# Patient Record
Sex: MALE | Race: OTHER | Employment: OTHER | URBAN - METROPOLITAN AREA
[De-identification: names, ages, dates, MRNs, and addresses within clinical notes are randomized per-mention and may not be internally consistent; named-entity substitution may affect disease eponyms.]

---

## 2020-06-23 ENCOUNTER — VIRTUAL VISIT (OUTPATIENT)
Dept: PRIMARY CARE CLINIC | Age: 75
End: 2020-06-23

## 2020-06-23 DIAGNOSIS — G89.29 CHRONIC PAIN OF BOTH KNEES: Primary | ICD-10-CM

## 2020-06-23 DIAGNOSIS — M25.562 CHRONIC PAIN OF BOTH KNEES: Primary | ICD-10-CM

## 2020-06-23 DIAGNOSIS — E78.2 MIXED HYPERLIPIDEMIA: ICD-10-CM

## 2020-06-23 DIAGNOSIS — N40.0 BENIGN PROSTATIC HYPERPLASIA WITHOUT LOWER URINARY TRACT SYMPTOMS: ICD-10-CM

## 2020-06-23 DIAGNOSIS — M25.561 CHRONIC PAIN OF BOTH KNEES: Primary | ICD-10-CM

## 2020-06-23 DIAGNOSIS — K21.9 GASTROESOPHAGEAL REFLUX DISEASE WITHOUT ESOPHAGITIS: ICD-10-CM

## 2020-06-23 DIAGNOSIS — M19.90 ARTHRITIS: ICD-10-CM

## 2020-06-23 RX ORDER — PHENOL/SODIUM PHENOLATE
20 AEROSOL, SPRAY (ML) MUCOUS MEMBRANE DAILY
Qty: 30 TAB | Refills: 0 | Status: SHIPPED | OUTPATIENT
Start: 2020-06-23 | End: 2020-12-07 | Stop reason: SDUPTHER

## 2020-06-23 RX ORDER — TERAZOSIN 2 MG/1
2 CAPSULE ORAL
Qty: 30 CAP | Refills: 0 | COMMUNITY
Start: 2020-06-23 | End: 2020-06-23 | Stop reason: SDUPTHER

## 2020-06-23 RX ORDER — TERAZOSIN 2 MG/1
2 CAPSULE ORAL
Qty: 30 CAP | Refills: 0 | Status: SHIPPED | OUTPATIENT
Start: 2020-06-23 | End: 2020-08-03

## 2020-06-23 RX ORDER — FENOFIBRATE 145 MG/1
145 TABLET, COATED ORAL DAILY
Qty: 30 TAB | Refills: 0 | COMMUNITY
Start: 2020-06-23 | End: 2021-07-13 | Stop reason: ALTCHOICE

## 2020-06-23 RX ORDER — PHENOL/SODIUM PHENOLATE
20 AEROSOL, SPRAY (ML) MUCOUS MEMBRANE DAILY
Qty: 30 TAB | Refills: 0 | COMMUNITY
Start: 2020-06-23 | End: 2020-06-23 | Stop reason: SDUPTHER

## 2020-06-23 RX ORDER — DICLOFENAC SODIUM 10 MG/G
2 GEL TOPICAL 4 TIMES DAILY
Qty: 100 G | Refills: 0 | Status: SHIPPED | OUTPATIENT
Start: 2020-06-23 | End: 2020-08-04

## 2020-06-23 RX ORDER — TRAMADOL HYDROCHLORIDE 50 MG/1
50 TABLET ORAL
Qty: 12 TAB | Refills: 0 | Status: SHIPPED | OUTPATIENT
Start: 2020-06-23 | End: 2020-07-31 | Stop reason: SDUPTHER

## 2020-06-23 RX ORDER — FENOFIBRATE 160 MG/1
160 TABLET ORAL DAILY
Qty: 90 TAB | Refills: 0 | Status: SHIPPED | OUTPATIENT
Start: 2020-06-23 | End: 2020-10-18

## 2020-06-23 NOTE — PROGRESS NOTES
Written by Aylin Rockwell, as dictated by Dr. Rosalio Murrieta MD.  Todd Montenegro is a 67 y.o. male. HPI  I was in the office while conducting this encounter. Consent:  He and/or his healthcare decision maker is aware that this patient-initiated Telehealth encounter is a billable service, with coverage as determined by his insurance carrier. He is aware that he may receive a bill and has provided verbal consent to proceed: Yes    This virtual visit was conducted via [employer]. Pursuant to the emergency declaration under the [facility], [facility] waiver authority and the [employer] Act, this Virtual  Visit was conducted to reduce the patient's risk of exposure to COVID-19 and provide continuity of care for an established patient. Services were provided through a video synchronous discussion virtually to substitute for in-person clinic visit. Due to this being a TeleHealth evaluation, many elements of the physical examination are unable to be assessed. Pt presents today to review his prescriptions and establish care. His daughter is also present, who helps translates during the visit. He has been taking Tylenol Arthritis, Ibuprofen PRN and Tramadol 50 mg (per specialist) PRN for his bilateral knee pain over the last 2-6 years. Every 6 months he receives bilateral gel and cortisone injections through his specialist. He inquires about better pain treatment for his bilateral arthritic knee pains. According to pt, his labs 4-5 months ago were all normal, including kidney numbers. He notes some back and neck pains that he previously went to PT 3x/weekly for. Previously he has tried OTC creams for pain management with no relief. His BP today was 127/82 with a pulse of 85. Pt continues on Fenofibrate 160 mg, Omeprazole 20 mg, Terazosin 2 mg, a multivitamin, and Asprin 81 mg.  He notes some occasional break through heart burn. His daughter denies hx of heart condition or DM. His daughter is concerned about an in-person visit due to Herminio and cancelled his recent in person visit with Dr. Chelsy Amato. Patient Active Problem List   Diagnosis Code    Benign prostatic hyperplasia without lower urinary tract symptoms N40.0    Arthritis M19.90    Gastroesophageal reflux disease without esophagitis K21.9    Mixed hyperlipidemia E78.2        Current Outpatient Medications on File Prior to Visit   Medication Sig Dispense Refill    fenofibrate nanocrystallized (TRICOR) 145 mg tablet Take 1 Tab by mouth daily. 30 Tab 0    [DISCONTINUED] Omeprazole delayed release (PRILOSEC D/R) 20 mg tablet Take 1 Tab by mouth daily. 30 Tab 0    [DISCONTINUED] terazosin (HYTRIN) 2 mg capsule Take 1 Cap by mouth nightly. 30 Cap 0     No current facility-administered medications on file prior to visit.             Social History     Socioeconomic History    Marital status: UNKNOWN     Spouse name: Not on file    Number of children: Not on file    Years of education: Not on file    Highest education level: Not on file   Occupational History    Not on file   Social Needs    Financial resource strain: Not on file    Food insecurity     Worry: Not on file     Inability: Not on file    Transportation needs     Medical: Not on file     Non-medical: Not on file   Tobacco Use    Smoking status: Not on file   Substance and Sexual Activity    Alcohol use: Not on file    Drug use: Not on file    Sexual activity: Not on file   Lifestyle    Physical activity     Days per week: Not on file     Minutes per session: Not on file    Stress: Not on file   Relationships    Social connections     Talks on phone: Not on file     Gets together: Not on file     Attends Holiness service: Not on file     Active member of club or organization: Not on file     Attends meetings of clubs or organizations: Not on file     Relationship status: Not on file    Intimate partner violence     Fear of current or ex partner: Not on file     Emotionally abused: Not on file     Physically abused: Not on file     Forced sexual activity: Not on file   Other Topics Concern    Not on file   Social History Narrative    Not on file       No results found for any previous visit.   '    Review of Systems   Constitutional: Negative for malaise/fatigue and weight loss. HENT: Negative for congestion and hearing loss. Eyes: Negative for blurred vision and photophobia. Respiratory: Negative for cough and shortness of breath. Cardiovascular: Negative for chest pain and leg swelling. Gastrointestinal: Negative for constipation, diarrhea and heartburn. Genitourinary: Negative for dysuria, frequency and urgency. Musculoskeletal: Positive for joint pain (Bilateral knee). Negative for myalgias. Neurological: Negative for dizziness and headaches. Psychiatric/Behavioral: Negative for depression and substance abuse. The patient is not nervous/anxious and does not have insomnia. There were no vitals taken for this visit. Physical Exam  Constitutional:       General: He is not in acute distress. Appearance: Normal appearance. He is not diaphoretic. HENT:      Head: Normocephalic and atraumatic. Nose: No congestion. Eyes:      General:         Right eye: No discharge. Left eye: No discharge. Conjunctiva/sclera: Conjunctivae normal.   Pulmonary:      Effort: Pulmonary effort is normal. No respiratory distress. Neurological:      General: No focal deficit present. Mental Status: He is alert and oriented to person, place, and time. Mental status is at baseline. Psychiatric:         Behavior: Behavior normal.         Thought Content: Thought content normal.         ASSESSMENT and PLAN    ICD-10-CM ICD-9-CM    1. Chronic pain of both knees M25.561 719.46 diclofenac (VOLTAREN) 1 % gel sent to pharmacy.       M25.562 338.29 traMADoL (ULTRAM) 50 mg tablet sent to pharmacy. G89.29    Refilled a few tablets of Tramadol for pt to use very PRN. Informed pt that I am not comfortable with him taking Tramadol frequently and long-term at his age and if he wants to take it long-term he should talk to pain management. Prescribed Diclofenac gel for pt to use up to QID to manage his knee pain. Potential side effects were discussed. Explained that since it is a localized epidermal pain management, it affects the kidney and liver negligibly. Discussed with pt that he can continue with injections with Dr. Sonal Meyer and try this prescription strength gel between the injections and monitor for pain relief. 2. Arthritis M19.90 716.90 diclofenac (VOLTAREN) 1 % gel sent to pharmacy. traMADoL (ULTRAM) 50 mg tablet sent to pharmacy. . Discussed with pt that he can continue with injections with Dr. Sonal Meyer and try this prescription strength gel between the injections and monitor for pain relief. 3. Gastroesophageal reflux disease without esophagitis K21.9 530.81 Omeprazole delayed release (PRILOSEC D/R) 20 mg tablet sent to pharmacy. Stable, pt continues on Omeprazole 20 mg with some noted occasional break through heart burn. Discussed with pt that Ibuprofen and Aleve can cause increased reflux. 4. Mixed hyperlipidemia E78.2 272.2 fenofibrate nanocrystallized (TRICOR) 145 mg tablet sent to pharmacy. Stable, pt continues on Fenofibrate. fenofibrate (LOFIBRA) 160 mg tablet sent to pharmacy. 5. Benign prostatic hyperplasia without lower urinary tract symptoms N40.0 600.00 terazosin (HYTRIN) 2 mg capsule sent to pharmacy. Stable, pt continues on Terazosin with no BPH sx noted. This plan was reviewed with the patient and patient agrees. All questions were answered. This scribe documentation was reviewed by me and accurately reflects the examination and decisions made by me.     This note will not be viewable in [address]

## 2020-07-31 DIAGNOSIS — M25.561 CHRONIC PAIN OF BOTH KNEES: ICD-10-CM

## 2020-07-31 DIAGNOSIS — M19.90 ARTHRITIS: ICD-10-CM

## 2020-07-31 DIAGNOSIS — G89.29 CHRONIC PAIN OF BOTH KNEES: ICD-10-CM

## 2020-07-31 DIAGNOSIS — M25.562 CHRONIC PAIN OF BOTH KNEES: ICD-10-CM

## 2020-08-03 DIAGNOSIS — N40.0 BENIGN PROSTATIC HYPERPLASIA WITHOUT LOWER URINARY TRACT SYMPTOMS: ICD-10-CM

## 2020-08-03 RX ORDER — TRAMADOL HYDROCHLORIDE 50 MG/1
50 TABLET ORAL
Qty: 12 TAB | Refills: 0 | Status: SHIPPED | OUTPATIENT
Start: 2020-08-03 | End: 2020-08-12

## 2020-08-03 RX ORDER — TERAZOSIN 2 MG/1
CAPSULE ORAL
Qty: 30 CAP | Refills: 0 | Status: SHIPPED | OUTPATIENT
Start: 2020-08-03 | End: 2020-08-26

## 2020-08-04 DIAGNOSIS — G89.29 CHRONIC PAIN OF BOTH KNEES: ICD-10-CM

## 2020-08-04 DIAGNOSIS — M19.90 ARTHRITIS: ICD-10-CM

## 2020-08-04 DIAGNOSIS — M25.562 CHRONIC PAIN OF BOTH KNEES: ICD-10-CM

## 2020-08-04 DIAGNOSIS — M25.561 CHRONIC PAIN OF BOTH KNEES: ICD-10-CM

## 2020-08-04 RX ORDER — DICLOFENAC SODIUM 10 MG/G
GEL TOPICAL
Qty: 100 G | Refills: 0 | Status: SHIPPED | OUTPATIENT
Start: 2020-08-04 | End: 2020-08-28

## 2020-08-04 RX ORDER — TRAMADOL HYDROCHLORIDE 50 MG/1
50 TABLET ORAL
Qty: 12 TAB | Refills: 0 | OUTPATIENT
Start: 2020-08-04 | End: 2020-08-13

## 2020-08-04 NOTE — TELEPHONE ENCOUNTER
Pharmacy is sending patient request for larger supply of medication tramadol  Refilled yesterday for 12 tabs

## 2020-08-26 DIAGNOSIS — N40.0 BENIGN PROSTATIC HYPERPLASIA WITHOUT LOWER URINARY TRACT SYMPTOMS: ICD-10-CM

## 2020-08-26 RX ORDER — TERAZOSIN 2 MG/1
CAPSULE ORAL
Qty: 30 CAP | Refills: 0 | Status: SHIPPED | OUTPATIENT
Start: 2020-08-26 | End: 2020-09-25

## 2020-08-28 DIAGNOSIS — M25.562 CHRONIC PAIN OF BOTH KNEES: ICD-10-CM

## 2020-08-28 DIAGNOSIS — M19.90 ARTHRITIS: ICD-10-CM

## 2020-08-28 DIAGNOSIS — M25.561 CHRONIC PAIN OF BOTH KNEES: ICD-10-CM

## 2020-08-28 DIAGNOSIS — G89.29 CHRONIC PAIN OF BOTH KNEES: ICD-10-CM

## 2020-08-28 RX ORDER — DICLOFENAC SODIUM 10 MG/G
GEL TOPICAL
Qty: 100 G | Refills: 0 | Status: SHIPPED | OUTPATIENT
Start: 2020-08-28 | End: 2021-07-13 | Stop reason: ALTCHOICE

## 2020-09-23 ENCOUNTER — OFFICE VISIT (OUTPATIENT)
Dept: PRIMARY CARE CLINIC | Age: 75
End: 2020-09-23
Payer: MEDICARE

## 2020-09-23 VITALS
BODY MASS INDEX: 26.97 KG/M2 | HEART RATE: 64 BPM | WEIGHT: 188.4 LBS | HEIGHT: 70 IN | TEMPERATURE: 98.3 F | DIASTOLIC BLOOD PRESSURE: 82 MMHG | SYSTOLIC BLOOD PRESSURE: 146 MMHG | OXYGEN SATURATION: 98 % | RESPIRATION RATE: 16 BRPM

## 2020-09-23 DIAGNOSIS — M25.562 CHRONIC PAIN OF LEFT KNEE: ICD-10-CM

## 2020-09-23 DIAGNOSIS — Z13.5 GLAUCOMA SCREENING: ICD-10-CM

## 2020-09-23 DIAGNOSIS — G89.29 CHRONIC PAIN OF LEFT KNEE: ICD-10-CM

## 2020-09-23 DIAGNOSIS — M19.90 ARTHRITIS: ICD-10-CM

## 2020-09-23 DIAGNOSIS — Z71.89 ACP (ADVANCE CARE PLANNING): ICD-10-CM

## 2020-09-23 DIAGNOSIS — R73.02 IGT (IMPAIRED GLUCOSE TOLERANCE): ICD-10-CM

## 2020-09-23 DIAGNOSIS — N40.0 BENIGN PROSTATIC HYPERPLASIA WITHOUT LOWER URINARY TRACT SYMPTOMS: ICD-10-CM

## 2020-09-23 DIAGNOSIS — E78.2 MIXED HYPERLIPIDEMIA: ICD-10-CM

## 2020-09-23 DIAGNOSIS — F51.01 PRIMARY INSOMNIA: ICD-10-CM

## 2020-09-23 DIAGNOSIS — Z23 NEEDS FLU SHOT: ICD-10-CM

## 2020-09-23 DIAGNOSIS — Z00.00 MEDICARE ANNUAL WELLNESS VISIT, SUBSEQUENT: Primary | ICD-10-CM

## 2020-09-23 LAB
ALBUMIN SERPL-MCNC: 4.2 G/DL (ref 3.5–5)
ALBUMIN/GLOB SERPL: 1.4 {RATIO} (ref 1.1–2.2)
ALP SERPL-CCNC: 48 U/L (ref 45–117)
ALT SERPL-CCNC: 28 U/L (ref 12–78)
ANION GAP SERPL CALC-SCNC: 5 MMOL/L (ref 5–15)
AST SERPL-CCNC: 20 U/L (ref 15–37)
BILIRUB SERPL-MCNC: 0.6 MG/DL (ref 0.2–1)
BUN SERPL-MCNC: 18 MG/DL (ref 6–20)
BUN/CREAT SERPL: 16 (ref 12–20)
CALCIUM SERPL-MCNC: 9.1 MG/DL (ref 8.5–10.1)
CHLORIDE SERPL-SCNC: 108 MMOL/L (ref 97–108)
CHOLEST SERPL-MCNC: 151 MG/DL
CO2 SERPL-SCNC: 26 MMOL/L (ref 21–32)
CREAT SERPL-MCNC: 1.11 MG/DL (ref 0.7–1.3)
ERYTHROCYTE [DISTWIDTH] IN BLOOD BY AUTOMATED COUNT: 11.9 % (ref 11.5–14.5)
EST. AVERAGE GLUCOSE BLD GHB EST-MCNC: 108 MG/DL
GLOBULIN SER CALC-MCNC: 3 G/DL (ref 2–4)
GLUCOSE SERPL-MCNC: 88 MG/DL (ref 65–100)
HBA1C MFR BLD: 5.4 % (ref 4–5.6)
HCT VFR BLD AUTO: 40.4 % (ref 36.6–50.3)
HDLC SERPL-MCNC: 45 MG/DL
HDLC SERPL: 3.4 {RATIO} (ref 0–5)
HGB BLD-MCNC: 13.8 G/DL (ref 12.1–17)
LDLC SERPL CALC-MCNC: 70.2 MG/DL (ref 0–100)
LIPID PROFILE,FLP: ABNORMAL
MCH RBC QN AUTO: 31.4 PG (ref 26–34)
MCHC RBC AUTO-ENTMCNC: 34.2 G/DL (ref 30–36.5)
MCV RBC AUTO: 92 FL (ref 80–99)
NRBC # BLD: 0 K/UL (ref 0–0.01)
NRBC BLD-RTO: 0 PER 100 WBC
PLATELET # BLD AUTO: 182 K/UL (ref 150–400)
PMV BLD AUTO: 11 FL (ref 8.9–12.9)
POTASSIUM SERPL-SCNC: 4.2 MMOL/L (ref 3.5–5.1)
PROT SERPL-MCNC: 7.2 G/DL (ref 6.4–8.2)
PSA SERPL-MCNC: 1.2 NG/ML (ref 0.01–4)
RBC # BLD AUTO: 4.39 M/UL (ref 4.1–5.7)
SODIUM SERPL-SCNC: 139 MMOL/L (ref 136–145)
TRIGL SERPL-MCNC: 179 MG/DL (ref ?–150)
VLDLC SERPL CALC-MCNC: 35.8 MG/DL
WBC # BLD AUTO: 4.8 K/UL (ref 4.1–11.1)

## 2020-09-23 PROCEDURE — G0008 ADMIN INFLUENZA VIRUS VAC: HCPCS | Performed by: INTERNAL MEDICINE

## 2020-09-23 PROCEDURE — 99214 OFFICE O/P EST MOD 30 MIN: CPT | Performed by: INTERNAL MEDICINE

## 2020-09-23 PROCEDURE — G0439 PPPS, SUBSEQ VISIT: HCPCS | Performed by: INTERNAL MEDICINE

## 2020-09-23 PROCEDURE — G8510 SCR DEP NEG, NO PLAN REQD: HCPCS | Performed by: INTERNAL MEDICINE

## 2020-09-23 PROCEDURE — 90694 VACC AIIV4 NO PRSRV 0.5ML IM: CPT | Performed by: INTERNAL MEDICINE

## 2020-09-23 PROCEDURE — 1101F PT FALLS ASSESS-DOCD LE1/YR: CPT | Performed by: INTERNAL MEDICINE

## 2020-09-23 PROCEDURE — G8536 NO DOC ELDER MAL SCRN: HCPCS | Performed by: INTERNAL MEDICINE

## 2020-09-23 PROCEDURE — G8419 CALC BMI OUT NRM PARAM NOF/U: HCPCS | Performed by: INTERNAL MEDICINE

## 2020-09-23 PROCEDURE — G8427 DOCREV CUR MEDS BY ELIG CLIN: HCPCS | Performed by: INTERNAL MEDICINE

## 2020-09-23 RX ORDER — DICLOFENAC SODIUM 75 MG/1
75 TABLET, DELAYED RELEASE ORAL 2 TIMES DAILY
Qty: 60 TAB | Refills: 0 | Status: SHIPPED | OUTPATIENT
Start: 2020-09-23 | End: 2020-10-23

## 2020-09-23 RX ORDER — CYCLOBENZAPRINE HCL 10 MG
10 TABLET ORAL
Qty: 30 TAB | Refills: 0 | Status: SHIPPED | OUTPATIENT
Start: 2020-09-23 | End: 2020-10-23

## 2020-09-23 NOTE — PROGRESS NOTES
Linda Seals is a 67 y.o. male and presents for Annual Medicare Wellness Visit. Assessment of cognitive impairment: Alert and oriented x 3     Depression Screen:   3 most recent PHQ Screens 9/23/2020   Little interest or pleasure in doing things Not at all   Feeling down, depressed, irritable, or hopeless Not at all   Total Score PHQ 2 0       Fall Risk Assessment:    Fall Risk Assessment, last 12 mths 9/23/2020   Able to walk? Yes   Fall in past 12 months? No       Abuse Screen:   Abuse Screening Questionnaire 9/23/2020   Do you ever feel afraid of your partner? N   Are you in a relationship with someone who physically or mentally threatens you? N   Is it safe for you to go home? Y       Activities of Daily Living:  Self-care. Requires assistance with: ambulation and no ADLs  Patient handle his/her own medications  yes Use of pill box  yes  Activities of Daily Living:   ADL Assessment 9/23/2020   Feeding yourself No Help Needed   Getting from bed to chair No Help Needed   Getting dressed No Help Needed   Bathing or showering No Help Needed   Walk across the room (includes cane/walker) No Help Needed   Using the telphone No Help Needed   Taking your medications No Help Needed   Preparing meals No Help Needed   Managing money (expenses/bills) No Help Needed   Moderately strenuous housework (laundry) No Help Needed   Shopping for personal items (toiletries/medicines) No Help Needed   Shopping for groceries No Help Needed   Driving No Help Needed   Climbing a flight of stairs No Help Needed   Getting to places beyond walking distances No Help Needed       Health Maintenance:  Daily Aspirin: yes  Bone Density:N/a   Glaucoma Screening: 3 years ago in [place]   Immunizations:    Tetanus: not sure , will find out from previous PCP . Influenza: will give today . Shingles: declined   PPSV-23:thinks he got it in [place] . Prevnar-13: N/a     Cancer screening:    . Colon: done in 2017 in [place]   Prostate:   Will check today Alcohol Risk Screen:   On any occasion during the past 3 months, have you had more than 3 drinks(female) or 4 drinks (male) containing alcohol in one? No  Do you average more than 7 drinks (female) or 14 drinks (male) per week? No      Hearing Loss:  Hearing is good. denies any hearing loss    Vision Loss:   Wears glasses, contact lenses, or have any other visual impairment  yes    Adult Nutrition Screen:  No risk factors noted. Advance Care Planning:   End of Life Planning: has NO advanced directive  - add't info requested. Referral to SW: yes,   [facility] ACP-Facilitator appointment yes      Medications/Allergies: Reviewed with patient  Prior to Admission medications    Medication Sig Start Date End Date Taking? Authorizing Provider   diclofenac EC (VOLTAREN) 75 mg EC tablet Take 1 Tab by mouth two (2) times a day for 30 days. 9/23/20 10/23/20 Yes Albert Cuellar MD   cyclobenzaprine (FLEXERIL) 10 mg tablet Take 1 Tab by mouth nightly for 30 doses. 9/23/20 10/23/20 Yes Albert Cuellar MD   diclofenac (VOLTAREN) 1 % gel APPLY 2 GRAMS TO AFFECTED AREA FOUR (4) TIMES DAILY FOR 30 DAYS. 8/28/20  Yes Albert Cuellar MD   terazosin (HYTRIN) 2 mg capsule TAKE 1 CAPSULE BY MOUTH EVERY DAY AT NIGHT 8/26/20  Yes Albert Cuellar MD   fenofibrate nanocrystallized (TRICOR) 145 mg tablet Take 1 Tab by mouth daily. 6/23/20  Yes Albert Cuellar MD   Omeprazole delayed release (PRILOSEC D/R) 20 mg tablet Take 1 Tab by mouth daily. 6/23/20  Yes Albert Cuellar MD       No Known Allergies    Objective:  Visit Vitals  BP (!) 152/74 (BP 1 Location: Right arm, BP Patient Position: Sitting)   Pulse 64   Temp 98.3 °F (36.8 °C) (Temporal)   Resp 16   Ht 5' 10\" (1.778 m)   Wt 188 lb 6.4 oz (85.5 kg)   SpO2 98%   BMI 27.03 kg/m²    Body mass index is 27.03 kg/m². Problem List: Reviewed with patient and discussed risk factors.     Patient Active Problem List   Diagnosis Code    Benign prostatic hyperplasia without lower urinary tract symptoms N40.0    Arthritis M19.90    Gastroesophageal reflux disease without esophagitis K21.9    Mixed hyperlipidemia E78.2       PSH: Reviewed with patient  No past surgical history on file. SH: Reviewed with patient  Social History     Tobacco Use    Smoking status: Never Smoker    Smokeless tobacco: Never Used   Substance Use Topics    Alcohol use: Not Currently     Frequency: Never    Drug use: Never       FH: Reviewed with patient  No family history on file. Current medical providers:    Patient Care Team:  Olimpia Alvares MD as PCP - General (Internal Medicine)  Olimpia Alvares MD as PCP - [facility] Provider    Plan:    Diagnoses and all orders for this visit:    Medicare annual wellness visit, subsequent  Immunization and Health screening discussed with him. He will get his record from his previous PCP office. ACP (advance care planning)  -     REFERRAL TO ACP CLINICAL SPECIALIST      Needs flu shot  -     FLU (FLUAD QUAD INFLUENZA VACCINE,QUAD,ADJUVANTED)    Glaucoma screening  -     REFERRAL TO OPHTHALMOLOGY        Orders Placed This Encounter    METABOLIC PANEL, COMPREHENSIVE    CBC W/O DIFF    LIPID PANEL    HEMOGLOBIN A1C WITH EAG    PROSTATE SPECIFIC AG    diclofenac EC (VOLTAREN) 75 mg EC tablet    cyclobenzaprine (FLEXERIL) 10 mg tablet       Health Maintenance   Topic Date Due    DTaP/Tdap/Td series (1 - Tdap) 05/15/1965    Shingrix Vaccine Age 50> (1 of 2) 05/15/1994    GLAUCOMA SCREENING Q2Y  05/15/2009    Pneumococcal 65+ years (1 of 1 - PPSV23) 05/15/2009    Flu Vaccine (1) 09/01/2020          Urinary/ Fecal Incontinence: none     Regular physical exercise: Stays active , walks 30 minutes daily     Patient verbalized understanding of information presented. AVS and Medicare Part B Preventive Services Table printed and given to pt and reviewed. See table for findings under Recommendation and Scheduled.  All of the patient's questions were answered. tiffanie by Katheren Boxer, as dictated by Dr. Mike Blankenship MD.    Pia Campbell is a 67 y.o. male. HPI  Pt presents today for routine care follow-up. Pt's BP is elevated today in office at 152/74, 146/82 on manual repeat in R arm while sitting down. His readings at home are running in the normal range. He could not sleep very well last night because his B/L thumbs and L knee were hurting. He used to get tramadol from his old doctor. He sees Dr. Suni Gant for injections in his knees every 3 month but he does not get pain medication from Dr. Suni Gant because insurance does not cover it. Dr. Suni Gant has said that his knee was improving with PT. Surgery is an option, but he does not want to have it yet. The pain does not bother him during the daytime, but it is bad at night. He would like to get the flu vaccine in office today. Patient Active Problem List   Diagnosis Code    Benign prostatic hyperplasia without lower urinary tract symptoms N40.0    Arthritis M19.90    Gastroesophageal reflux disease without esophagitis K21.9    Mixed hyperlipidemia E78.2        Current Outpatient Medications on File Prior to Visit   Medication Sig Dispense Refill    diclofenac (VOLTAREN) 1 % gel APPLY 2 GRAMS TO AFFECTED AREA FOUR (4) TIMES DAILY FOR 30 DAYS. 100 g 0    terazosin (HYTRIN) 2 mg capsule TAKE 1 CAPSULE BY MOUTH EVERY DAY AT NIGHT 30 Cap 0    fenofibrate nanocrystallized (TRICOR) 145 mg tablet Take 1 Tab by mouth daily. 30 Tab 0    Omeprazole delayed release (PRILOSEC D/R) 20 mg tablet Take 1 Tab by mouth daily. 30 Tab 0     No current facility-administered medications on file prior to visit.         No Known Allergies      Social History     Socioeconomic History    Marital status: UNKNOWN     Spouse name: Not on file    Number of children: Not on file    Years of education: Not on file    Highest education level: Not on file   Occupational History    Not on file   Social Needs    Financial resource strain: Not on file    Food insecurity     Worry: Not on file     Inability: Not on file    Transportation needs     Medical: Not on file     Non-medical: Not on file   Tobacco Use    Smoking status: Never Smoker    Smokeless tobacco: Never Used   Substance and Sexual Activity    Alcohol use: Not Currently     Frequency: Never    Drug use: Never    Sexual activity: Not on file   Lifestyle    Physical activity     Days per week: Not on file     Minutes per session: Not on file    Stress: Not on file   Relationships    Social connections     Talks on phone: Not on file     Gets together: Not on file     Attends Yazdanism service: Not on file     Active member of club or organization: Not on file     Attends meetings of clubs or organizations: Not on file     Relationship status: Not on file    Intimate partner violence     Fear of current or ex partner: Not on file     Emotionally abused: Not on file     Physically abused: Not on file     Forced sexual activity: Not on file   Other Topics Concern    Not on file   Social History Narrative    Not on file       No visits with results within 3 Month(s) from this visit. Latest known visit with results is:   No results found for any previous visit. Review of Systems   Constitutional: Negative for malaise/fatigue and weight loss. HENT: Negative for congestion and sore throat. Eyes: Negative for blurred vision and photophobia. Respiratory: Negative for cough and shortness of breath. Cardiovascular: Negative for chest pain and leg swelling. Gastrointestinal: Negative for constipation and heartburn. Genitourinary: Negative for frequency and urgency. Musculoskeletal: Positive for joint pain (L knee; B/L thumbs). Negative for back pain and myalgias. Neurological: Negative for dizziness and headaches. Psychiatric/Behavioral: Negative for depression. The patient has insomnia.  The patient is not nervous/anxious. Visit Vitals  BP (!) 146/82 (BP 1 Location: Right arm, BP Patient Position: Sitting)   Pulse 64   Temp 98.3 °F (36.8 °C) (Temporal)   Resp 16   Ht 5' 10\" (1.778 m)   Wt 188 lb 6.4 oz (85.5 kg)   SpO2 98%   BMI 27.03 kg/m²     Physical Exam  Vitals signs and nursing note reviewed. Constitutional:       General: He is not in acute distress. Appearance: Normal appearance. He is well-developed and well-groomed. He is not diaphoretic. HENT:      Head: Normocephalic and atraumatic. Right Ear: Hearing and external ear normal.      Left Ear: Hearing and external ear normal.      Nose: No congestion. Eyes:      General: No scleral icterus. Right eye: No discharge. Left eye: No discharge. Extraocular Movements: Extraocular movements intact. Conjunctiva/sclera: Conjunctivae normal.   Neck:      Musculoskeletal: Normal range of motion and neck supple. Cardiovascular:      Rate and Rhythm: Normal rate and regular rhythm. Pulmonary:      Effort: Pulmonary effort is normal. No respiratory distress. Breath sounds: Normal breath sounds and air entry. No wheezing. Abdominal:      General: Bowel sounds are normal.      Palpations: Abdomen is soft. Tenderness: There is no abdominal tenderness. Lymphadenopathy:      Cervical: No cervical adenopathy. Neurological:      Mental Status: He is alert and oriented to person, place, and time. Mental status is at baseline. Psychiatric:         Mood and Affect: Mood and affect normal.         Behavior: Behavior normal.         Thought Content: Thought content normal.       ASSESSMENT and PLAN    ICD-10-CM ICD-9-CM    1. Chronic pain of left knee  M25.562 719.46 diclofenac EC (VOLTAREN) 75 mg EC tablet sent to pharmacy. Potential side effects were discussed. I explained that tramadol is a controlled substance  that I cannot prescribe it long term for him. Instead, I prescribed diclofenac and flexeril. G89.29 338.29 cyclobenzaprine (FLEXERIL) 10 mg tablet sent to pharmacy. Potential side effects were discussed. I explained that tramadol is a controlled substance and that I cannot prescribe it long term for him. Instead, I prescribed diclofenac and flexeril. 2. Arthritis  M19.90 716.90 cyclobenzaprine (FLEXERIL) 10 mg tablet sent to pharmacy. Potential side effects were discussed. I explained that tramadol is a controlled substance and that I cannot prescribe it long term for him. Instead, I prescribed diclofenac and flexeril. 3. Primary insomnia  F51.01 307.42 We discussed that flexeril will likely help him sleep. 4. Benign prostatic hyperplasia without lower urinary tract symptoms  N40.0 600.00 PSA, DIAGNOSTIC (PROSTATE SPECIFIC AG)    Labs drawn in office today. 5. Mixed hyperlipidemia  P30.4 331.5 METABOLIC PANEL, COMPREHENSIVE      CBC W/O DIFF      LIPID PANEL    Ordered fasting labs for pt to complete today in office. Waiting on results. Recommended checking blood pressure at home & if above 140/90 let me know. 6. IGT (impaired glucose tolerance)  R73.02 790.22 HEMOGLOBIN A1C WITH EAG    Labs drawn in office today. This plan was reviewed with the patient and patient agrees. All questions were answered. This scribe documentation was reviewed by me and accurately reflects the examination and decisions made by me. This note will not be viewable in [address]

## 2020-09-23 NOTE — PATIENT INSTRUCTIONS
Medicare Wellness Visit, Male    The best way to live healthy is to have a lifestyle where you eat a well-balanced diet, exercise regularly, limit alcohol use, and quit all forms of tobacco/nicotine, if applicable. Regular preventive services are another way to keep healthy. Preventive services (vaccines, screening tests, monitoring & exams) can help personalize your care plan, which helps you manage your own care. Screening tests can find health problems at the earliest stages, when they are easiest to treat. [facility] follows the current, evidence-based guidelines published by the [facility] ([facility]) when recommending preventive services for our patients. Because we follow these guidelines, sometimes recommendations change over time as research supports it. (For example, a prostate screening blood test is no longer routinely recommended for men with no symptoms). Of course, you and your doctor may decide to screen more often for some diseases, based on your risk and co-morbidities (chronic disease you are already diagnosed with). Preventive services for you include:  - Medicare offers their members a free annual wellness visit, which is time for you and your primary care provider to discuss and plan for your preventive service needs. Take advantage of this benefit every year!  -All adults over age 72 should receive the recommended pneumonia vaccines. Current USPSTF guidelines recommend a series of two vaccines for the best pneumonia protection.   -All adults should have a flu vaccine yearly and tetanus vaccine every 10 years.  -All adults age 48 and older should receive the shingles vaccines (series of two vaccines).        -All adults age 38-68 who are overweight should have a diabetes screening test once every three years.   -Other screening tests & preventive services for persons with diabetes include: an eye exam to screen for diabetic retinopathy, a kidney function test, a foot exam, and stricter control over your cholesterol.   -Cardiovascular screening for adults with routine risk involves an electrocardiogram (ECG) at intervals determined by the provider.   -Colorectal cancer screening should be done for adults age 54-65 with no increased risk factors for colorectal cancer. There are a number of acceptable methods of screening for this type of cancer. Each test has its own benefits and drawbacks. Discuss with your provider what is most appropriate for you during your annual wellness visit. The different tests include: colonoscopy (considered the best screening method), a fecal occult blood test, a fecal DNA test, and sigmoidoscopy.  -All adults born between [facility] should be screened once for Hepatitis C.  -An Abdominal Aortic Aneurysm (AAA) Screening is recommended for men age 73-68 who has ever smoked in their lifetime.      Here is a list of your current Health Maintenance items (your personalized list of preventive services) with a due date:  Health Maintenance Due   Topic Date Due    DTaP/Tdap/Td  (1 - Tdap) 05/15/1965    Shingles Vaccine (1 of 2) 05/15/1994    Glaucoma Screening   05/15/2009    Pneumococcal Vaccine (1 of 1 - PPSV23) 05/15/2009    Yearly Flu Vaccine (1) 09/01/2020

## 2020-09-25 DIAGNOSIS — N40.0 BENIGN PROSTATIC HYPERPLASIA WITHOUT LOWER URINARY TRACT SYMPTOMS: ICD-10-CM

## 2020-09-25 RX ORDER — TERAZOSIN 2 MG/1
CAPSULE ORAL
Qty: 30 CAP | Refills: 0 | Status: SHIPPED | OUTPATIENT
Start: 2020-09-25 | End: 2020-10-22

## 2020-09-28 NOTE — PROGRESS NOTES
Confirmed speaking to daughter Renny Alexx and advised of labs. She request that a copy be mailed.  done

## 2020-09-28 NOTE — PROGRESS NOTES
Let his daughter know blood report came back fine including prostate number. Triglyceride is little elevated. Needs to cut down in rice & bread. Rest of the numbers came back fine.

## 2020-10-14 ENCOUNTER — TELEPHONE (OUTPATIENT)
Dept: OTHER | Age: 75
End: 2020-10-14

## 2020-10-14 NOTE — TELEPHONE ENCOUNTER
ACP Specialist attempted to contact August Elam regarding the ACP referral.  ACP Specialist called the telephone listed for patient [phone]. A person who identified herself as patient daughter, Ross Bella, answered the telephone. ACP Specialist identified herself, the purpose of the call and requested to speak to Mr. Baron Bowman. Ms. Kelly Medina stated that she was his POA and makes all decisions for patient. ACP Specialist asked if the POA document was provided to [employer]. Ms. Kelly Medina stated that a copy was not provided to [employer] and she may have a copy somewhere. ACP requested that Ms. Kelly Medina submit a copy to PCP office at her earliest convenience. ACP Specialist asked again to speak to Mr. Baron Bowman about ACP. Ms. Kelly Medina stated that there must have been a misunderstanding about requesting a ACP consult. ACP Specialist was unable to speak with Mr. Baron Bowman to discuss any portion of ACP. ACP Specialist inquired about patient language of choice which is Jessie.

## 2020-10-18 DIAGNOSIS — E78.2 MIXED HYPERLIPIDEMIA: ICD-10-CM

## 2020-10-18 RX ORDER — FENOFIBRATE 160 MG/1
TABLET ORAL
Qty: 90 TAB | Refills: 0 | Status: SHIPPED | OUTPATIENT
Start: 2020-10-18 | End: 2020-12-14 | Stop reason: SDUPTHER

## 2020-10-22 DIAGNOSIS — N40.0 BENIGN PROSTATIC HYPERPLASIA WITHOUT LOWER URINARY TRACT SYMPTOMS: ICD-10-CM

## 2020-10-22 RX ORDER — TERAZOSIN 2 MG/1
CAPSULE ORAL
Qty: 30 CAP | Refills: 0 | Status: SHIPPED | OUTPATIENT
Start: 2020-10-22 | End: 2020-11-15

## 2020-11-02 ENCOUNTER — OFFICE VISIT (OUTPATIENT)
Dept: PRIMARY CARE CLINIC | Age: 75
End: 2020-11-02
Payer: MEDICARE

## 2020-11-02 VITALS
OXYGEN SATURATION: 97 % | BODY MASS INDEX: 26.94 KG/M2 | HEART RATE: 60 BPM | TEMPERATURE: 97.3 F | DIASTOLIC BLOOD PRESSURE: 75 MMHG | HEIGHT: 70 IN | RESPIRATION RATE: 18 BRPM | WEIGHT: 188.2 LBS | SYSTOLIC BLOOD PRESSURE: 118 MMHG

## 2020-11-02 DIAGNOSIS — G89.29 CHRONIC PAIN OF BOTH KNEES: ICD-10-CM

## 2020-11-02 DIAGNOSIS — M25.562 CHRONIC PAIN OF BOTH KNEES: ICD-10-CM

## 2020-11-02 DIAGNOSIS — R00.0 TACHYCARDIA: ICD-10-CM

## 2020-11-02 DIAGNOSIS — M25.512 ACUTE PAIN OF LEFT SHOULDER: ICD-10-CM

## 2020-11-02 DIAGNOSIS — M19.90 ARTHRITIS: ICD-10-CM

## 2020-11-02 DIAGNOSIS — R07.9 CHEST PAIN, UNSPECIFIED TYPE: Primary | ICD-10-CM

## 2020-11-02 DIAGNOSIS — M25.561 CHRONIC PAIN OF BOTH KNEES: ICD-10-CM

## 2020-11-02 PROCEDURE — G8427 DOCREV CUR MEDS BY ELIG CLIN: HCPCS | Performed by: INTERNAL MEDICINE

## 2020-11-02 PROCEDURE — G8510 SCR DEP NEG, NO PLAN REQD: HCPCS | Performed by: INTERNAL MEDICINE

## 2020-11-02 PROCEDURE — G8419 CALC BMI OUT NRM PARAM NOF/U: HCPCS | Performed by: INTERNAL MEDICINE

## 2020-11-02 PROCEDURE — 93000 ELECTROCARDIOGRAM COMPLETE: CPT | Performed by: INTERNAL MEDICINE

## 2020-11-02 PROCEDURE — G8536 NO DOC ELDER MAL SCRN: HCPCS | Performed by: INTERNAL MEDICINE

## 2020-11-02 PROCEDURE — 99214 OFFICE O/P EST MOD 30 MIN: CPT | Performed by: INTERNAL MEDICINE

## 2020-11-02 PROCEDURE — 1101F PT FALLS ASSESS-DOCD LE1/YR: CPT | Performed by: INTERNAL MEDICINE

## 2020-11-02 RX ORDER — GUAIFENESIN 100 MG/5ML
81 LIQUID (ML) ORAL DAILY
COMMUNITY
End: 2021-07-13 | Stop reason: SDUPTHER

## 2020-11-02 RX ORDER — DICLOFENAC SODIUM 75 MG/1
TABLET, DELAYED RELEASE ORAL
COMMUNITY
End: 2021-07-13 | Stop reason: ALTCHOICE

## 2020-11-02 RX ORDER — MELOXICAM 15 MG/1
15 TABLET ORAL DAILY
COMMUNITY
End: 2020-11-02 | Stop reason: ALTCHOICE

## 2020-11-02 NOTE — PROGRESS NOTES
Written by Chemo Hough, as dictated by Dr. Mary Beth Lazo MD.    Ruben Carter is a 67 y.o. male. HPI  Pt presents today to discuss chest pain and tightness x4 days. He also has been experiencing palpitations with weakness. He has had no issues with dizziness so far. EKG done in office today shows sinus tachycardia. Pain management started him on diclofenac and meloxicam in addition to his injections for his knees, although he has not started taking meloxicam at all. He did start taking diclofenac, and his chest pain began when he started taking diclofenac. He is omeprazole qAM before breakfast still. He continues on fenofibrate, and sometimes his SBP runs high even though he is taking it. Patient Active Problem List   Diagnosis Code    Benign prostatic hyperplasia without lower urinary tract symptoms N40.0    Arthritis M19.90    Gastroesophageal reflux disease without esophagitis K21.9    Mixed hyperlipidemia E78.2        Current Outpatient Medications on File Prior to Visit   Medication Sig Dispense Refill    diclofenac EC (VOLTAREN) 75 mg EC tablet Take  by mouth.  aspirin 81 mg chewable tablet Take 81 mg by mouth daily.  terazosin (HYTRIN) 2 mg capsule TAKE 1 CAPSULE BY MOUTH EVERY DAY AT NIGHT 30 Cap 0    fenofibrate (LOFIBRA) 160 mg tablet TAKE 1 TABLET BY MOUTH EVERY DAY 90 Tab 0    diclofenac (VOLTAREN) 1 % gel APPLY 2 GRAMS TO AFFECTED AREA FOUR (4) TIMES DAILY FOR 30 DAYS. 100 g 0    fenofibrate nanocrystallized (TRICOR) 145 mg tablet Take 1 Tab by mouth daily. 30 Tab 0    Omeprazole delayed release (PRILOSEC D/R) 20 mg tablet Take 1 Tab by mouth daily. 30 Tab 0    [DISCONTINUED] meloxicam (MOBIC) 15 mg tablet Take 15 mg by mouth daily. No current facility-administered medications on file prior to visit.         No Known Allergies    Past Medical History:   Diagnosis Date    Arthritis     BPH (benign prostatic hyperplasia)     Hyperlipidemia        No past surgical history on file. No family history on file. Social History     Socioeconomic History    Marital status: UNKNOWN     Spouse name: Not on file    Number of children: Not on file    Years of education: Not on file    Highest education level: Not on file   Occupational History    Not on file   Social Needs    Financial resource strain: Not on file    Food insecurity     Worry: Not on file     Inability: Not on file    Transportation needs     Medical: Not on file     Non-medical: Not on file   Tobacco Use    Smoking status: Never Smoker    Smokeless tobacco: Never Used   Substance and Sexual Activity    Alcohol use: Not Currently     Frequency: Never    Drug use: Never    Sexual activity: Not on file   Lifestyle    Physical activity     Days per week: Not on file     Minutes per session: Not on file    Stress: Not on file   Relationships    Social connections     Talks on phone: Not on file     Gets together: Not on file     Attends Mandaen service: Not on file     Active member of club or organization: Not on file     Attends meetings of clubs or organizations: Not on file     Relationship status: Not on file    Intimate partner violence     Fear of current or ex partner: Not on file     Emotionally abused: Not on file     Physically abused: Not on file     Forced sexual activity: Not on file   Other Topics Concern    Not on file   Social History Narrative    Not on file       Office Visit on 09/23/2020   Component Date Value Ref Range Status    Prostate Specific Ag 09/23/2020 1.2  0.01 - 4.0 ng/mL Final    Comment: Method used is [employer]  (NOTE)  Many types of test methods are used to measure PSA and can yield   different results on any given specimen. Therefore PSA results from   different laboratories on the same patient are not directly   comparable.  In addition, PSA values by themselves should not be   interpreted as the presence or absence of cancer. PSA values used to   monitor for biochemical recurrence of prostate cancer should be   interpreted in accordance with current clinical guidelines (e.g. the   2013 American Urological Association (AUA) guidelines and the 2015    Association of Urology (EAU) guidelines).  Hemoglobin A1c 09/23/2020 5.4  4.0 - 5.6 % Final    Comment: NEW METHOD PLEASE NOTE NEW REFERENCE RANGE  (NOTE)  HbA1C Interpretive Ranges  <5.7              Normal  5.7 - 6.4         Consider Prediabetes  >6.5              Consider Diabetes      Est. average glucose 09/23/2020 108  mg/dL Final    LIPID PROFILE 09/23/2020        Final    Cholesterol, total 09/23/2020 151  <200 MG/DL Final    Triglyceride 09/23/2020 179* <150 MG/DL Final    Comment: Based on NCEP-ATP III:  Triglycerides <150 mg/dL  is considered normal, 150-199  mg/dL  borderline high,  200-499 mg/dL high and  greater than or equal to 500  mg/dL very high.  HDL Cholesterol 09/23/2020 45  MG/DL Final    Comment: Based on NCEP ATP III, HDL Cholesterol <40 mg/dL is considered low and >60  mg/dL is elevated.  LDL, calculated 09/23/2020 70.2  0 - 100 MG/DL Final    Comment: Based on the NCEP-ATP: LDL-C concentrations are considered  optimal <100 mg/dL,  near optimal/above Normal 100-129 mg/dL Borderline High: 130-159, High: 160-189  mg/dL Very High: Greater than or equal to 190 mg/dL      VLDL, calculated 09/23/2020 35.8  MG/DL Final    CHOL/HDL Ratio 09/23/2020 3.4  0.0 - 5.0   Final    WBC 09/23/2020 4.8  4.1 - 11.1 K/uL Final    RBC 09/23/2020 4.39  4. 10 - 5.70 M/uL Final    HGB 09/23/2020 13.8  12.1 - 17.0 g/dL Final    HCT 09/23/2020 40.4  36.6 - 50.3 % Final    MCV 09/23/2020 92.0  80.0 - 99.0 FL Final    MCH 09/23/2020 31.4  26.0 - 34.0 PG Final    MCHC 09/23/2020 34.2  30.0 - 36.5 g/dL Final    RDW 09/23/2020 11.9  11.5 - 14.5 % Final    PLATELET 80/54/4201 951  150 - 400 K/uL Final    MPV 09/23/2020 11.0  8.9 - 12.9 FL Final    NRBC 09/23/2020 0.0  0  WBC Final    ABSOLUTE NRBC 09/23/2020 0.00  0.00 - 0.01 K/uL Final    Sodium 09/23/2020 139  136 - 145 mmol/L Final    Potassium 09/23/2020 4.2  3.5 - 5.1 mmol/L Final    Chloride 09/23/2020 108  97 - 108 mmol/L Final    CO2 09/23/2020 26  21 - 32 mmol/L Final    Anion gap 09/23/2020 5  5 - 15 mmol/L Final    Glucose 09/23/2020 88  65 - 100 mg/dL Final    BUN 09/23/2020 18  6 - 20 MG/DL Final    Creatinine 09/23/2020 1.11  0.70 - 1.30 MG/DL Final    BUN/Creatinine ratio 09/23/2020 16  12 - 20   Final    GFR est AA 09/23/2020 >60  >60 ml/min/1.73m2 Final    GFR est non-AA 09/23/2020 >60  >60 ml/min/1.73m2 Final    Comment: Estimated GFR is calculated using the IDMS-traceable Modification of Diet in  Renal Disease (MDRD) Study equation, reported for both  Americans  (GFRAA) and non- Americans (GFRNA), and normalized to 1.73m2 body  surface area. The physician must decide which value applies to the patient.  Calcium 09/23/2020 9.1  8.5 - 10.1 MG/DL Final    Bilirubin, total 09/23/2020 0.6  0.2 - 1.0 MG/DL Final    ALT (SGPT) 09/23/2020 28  12 - 78 U/L Final    AST (SGOT) 09/23/2020 20  15 - 37 U/L Final    Alk. phosphatase 09/23/2020 48  45 - 117 U/L Final    Protein, total 09/23/2020 7.2  6.4 - 8.2 g/dL Final    Albumin 09/23/2020 4.2  3.5 - 5.0 g/dL Final    Globulin 09/23/2020 3.0  2.0 - 4.0 g/dL Final    A-G Ratio 09/23/2020 1.4  1.1 - 2.2   Final       Review of Systems   Constitutional: Negative for malaise/fatigue and weight loss. HENT: Negative for congestion and sore throat. Eyes: Negative for blurred vision. Respiratory: Negative for cough and shortness of breath. Cardiovascular: Positive for chest pain and palpitations. Negative for leg swelling. Gastrointestinal: Negative for constipation and heartburn. Genitourinary: Negative for frequency and urgency.    Musculoskeletal: Negative for back pain, joint pain and myalgias. Neurological: Positive for weakness. Negative for dizziness and headaches. Psychiatric/Behavioral: Negative for depression. The patient is not nervous/anxious and does not have insomnia. Visit Vitals  /75 (BP 1 Location: Left arm, BP Patient Position: Sitting)   Pulse 60   Temp 97.3 °F (36.3 °C) (Oral)   Resp 18   Ht 5' 10\" (1.778 m)   Wt 188 lb 3.2 oz (85.4 kg)   SpO2 97%   BMI 27.00 kg/m²     Physical Exam  Vitals signs and nursing note reviewed. Constitutional:       General: He is not in acute distress. Appearance: He is well-developed and well-groomed. He is not diaphoretic. HENT:      Right Ear: External ear normal.      Left Ear: External ear normal.   Eyes:      General: No scleral icterus. Right eye: No discharge. Left eye: No discharge. Extraocular Movements: Extraocular movements intact. Conjunctiva/sclera: Conjunctivae normal.   Neck:      Musculoskeletal: Normal range of motion and neck supple. Cardiovascular:      Rate and Rhythm: Normal rate and regular rhythm. Pulmonary:      Effort: Pulmonary effort is normal.      Breath sounds: Normal breath sounds. No wheezing. Abdominal:      General: Bowel sounds are normal.      Palpations: Abdomen is soft. Tenderness: There is no abdominal tenderness. Lymphadenopathy:      Cervical: No cervical adenopathy. Neurological:      Mental Status: He is alert and oriented to person, place, and time. Psychiatric:         Mood and Affect: Mood and affect normal.         Behavior: Behavior normal.       ASSESSMENT and PLAN    ICD-10-CM ICD-9-CM    1. Chest pain, unspecified type  R07.9 786.50 AMB POC EKG ROUTINE W/ 12 LEADS, INTER & REP    EKG done in office today showed sinus tachycardia. REFERRAL TO CARDIOLOGY      CARDIAC HOLTER MONITOR    I referred him to cardiology and ordered a Holter monitor for him to  and wear before his cardiology appt. On ASA.      2. Tachycardia  R00.0 785.0 REFERRAL TO CARDIOLOGY      CARDIAC HOLTER MONITOR    I referred him to cardiology and ordered a Holter monitor for him to  and wear before his cardiology appt. 3. Acute pain of left shoulder  M25.512 719.41 Will monitor for changes or improvements. 4. Arthritis  M19.90 716.90 He is following with pain management for injections and oral medication. 5. Chronic pain of both knees  M25.561 719.46 He is following with pain management for injections and oral medication. Told him he can`t have Meloxicam and diclofenac at the same time. M25.562 338.29     G89.29       This plan was reviewed with the patient and patient agrees. All questions were answered. This scribe documentation was reviewed by me and accurately reflects the examination and decisions made by me.

## 2020-11-03 ENCOUNTER — HOSPITAL ENCOUNTER (OUTPATIENT)
Dept: NON INVASIVE DIAGNOSTICS | Age: 75
Discharge: HOME | End: 2020-11-03
Attending: INTERNAL MEDICINE
Payer: MEDICARE

## 2020-11-03 DIAGNOSIS — R00.0 TACHYCARDIA: ICD-10-CM

## 2020-11-03 DIAGNOSIS — R07.9 CHEST PAIN, UNSPECIFIED TYPE: ICD-10-CM

## 2020-11-03 PROCEDURE — 93227 XTRNL ECG REC<48 HR R&I: CPT | Performed by: INTERNAL MEDICINE

## 2020-11-03 PROCEDURE — 93225 XTRNL ECG REC<48 HRS REC: CPT

## 2020-11-06 ENCOUNTER — OFFICE VISIT (OUTPATIENT)
Dept: CARDIOLOGY CLINIC | Age: 75
End: 2020-11-06
Payer: MEDICARE

## 2020-11-06 VITALS
OXYGEN SATURATION: 97 % | WEIGHT: 186 LBS | HEIGHT: 70 IN | DIASTOLIC BLOOD PRESSURE: 80 MMHG | HEART RATE: 73 BPM | RESPIRATION RATE: 18 BRPM | SYSTOLIC BLOOD PRESSURE: 130 MMHG | BODY MASS INDEX: 26.63 KG/M2

## 2020-11-06 DIAGNOSIS — R07.9 CHEST PAIN, UNSPECIFIED TYPE: ICD-10-CM

## 2020-11-06 DIAGNOSIS — E78.5 DYSLIPIDEMIA: ICD-10-CM

## 2020-11-06 DIAGNOSIS — Z87.891 HISTORY OF TOBACCO USE: ICD-10-CM

## 2020-11-06 DIAGNOSIS — I20.0 UNSTABLE ANGINA (HCC): Primary | ICD-10-CM

## 2020-11-06 DIAGNOSIS — R00.2 HEART PALPITATIONS: ICD-10-CM

## 2020-11-06 PROCEDURE — G8510 SCR DEP NEG, NO PLAN REQD: HCPCS | Performed by: INTERNAL MEDICINE

## 2020-11-06 PROCEDURE — 1101F PT FALLS ASSESS-DOCD LE1/YR: CPT | Performed by: INTERNAL MEDICINE

## 2020-11-06 PROCEDURE — G8427 DOCREV CUR MEDS BY ELIG CLIN: HCPCS | Performed by: INTERNAL MEDICINE

## 2020-11-06 PROCEDURE — G0463 HOSPITAL OUTPT CLINIC VISIT: HCPCS | Performed by: INTERNAL MEDICINE

## 2020-11-06 PROCEDURE — G8536 NO DOC ELDER MAL SCRN: HCPCS | Performed by: INTERNAL MEDICINE

## 2020-11-06 PROCEDURE — 99204 OFFICE O/P NEW MOD 45 MIN: CPT | Performed by: INTERNAL MEDICINE

## 2020-11-06 PROCEDURE — G8419 CALC BMI OUT NRM PARAM NOF/U: HCPCS | Performed by: INTERNAL MEDICINE

## 2020-11-06 NOTE — PROGRESS NOTES
[facility] [place]: Dipesh Maldonado  [phone]    History of Present Illness:  Mr. Stone Ambriz is a 68 yo M with a history of dyslipidemia, very remote tobacco use, quit 40 years ago, referred by Dr. Gloria Lundberg for cardiac evaluation. He is here due to recent palpitations, chest pain. When he saw his primary care physician had him wear a 48 hour Holter and he returned to [facility] yesterday. We did call over to [facility], but his report was not ready yet. With regard to the palpations, they have been occurring more so the last few weeks, he will note that his heart rate will be irregular and go up into the 120s, can happen with no clear triggers, can happen for minutes at a time. Also, at times he will get left sided chest discomfort that can sometimes be associated with the palpitations, can happen with rest or exertion. He gets knee injections once every few months. He does go on a bike and walk regularly. He does admit to external stressors and his wife is in the hospital.  He is retired. He was a  and various other jobs in the past.  They moved here to be near their daughter. He did have palpitations and a heart monitor placed while in [place] in 2017 that he thinks was okay at the time. He is compensated on exam with clear lungs and no lower extremity edema. Soc hx. No tobacco  Fam hx. No early CAD  Assessment and Plan:    1. Palpitations. Will review his monitor results when they come through. 2. Unstable angina. Chest pain with typical and atypical features and multiple risk factors; will proceed with stress test and echocardiogram for further evaluation. He cannot fully complete a treadmill due to knee issues and will do this Lexiscan. 3. Dyslipidemia. 4. Remote tobacco use. He  has a past medical history of Arthritis, BPH (benign prostatic hyperplasia), and Hyperlipidemia. All other systems negative except as above.      PE  Vitals:    11/06/20 1000   BP: 130/80 Pulse: 73   Resp: 18   SpO2: 97%   Weight: 186 lb (84.4 kg)   Height: 5' 10\" (1.778 m)    Body mass index is 26.69 kg/m². General appearance - alert, well appearing, and in no distress  Mental status - affect appropriate to mood  Eyes - sclera anicteric, moist mucous membranes  Neck - supple, no JVD  Chest - clear to auscultation, no wheezes, rales or rhonchi  Heart - normal rate, regular rhythm, normal S1, S2, no murmurs, rubs, clicks or gallops  Abdomen - soft, nontender, nondistended, no masses or organomegaly  Neurological - no focal deficit  Extremities - peripheral pulses normal, no pedal edema      Recent Labs:  Lab Results   Component Value Date/Time    Cholesterol, total 151 09/23/2020 02:42 PM    HDL Cholesterol 45 09/23/2020 02:42 PM    LDL, calculated 70.2 09/23/2020 02:42 PM    Triglyceride 179 (H) 09/23/2020 02:42 PM    CHOL/HDL Ratio 3.4 09/23/2020 02:42 PM     Lab Results   Component Value Date/Time    Creatinine 1.11 09/23/2020 02:42 PM     Lab Results   Component Value Date/Time    BUN 18 09/23/2020 02:42 PM     Lab Results   Component Value Date/Time    Potassium 4.2 09/23/2020 02:42 PM     Lab Results   Component Value Date/Time    Hemoglobin A1c 5.4 09/23/2020 02:42 PM     Lab Results   Component Value Date/Time    HGB 13.8 09/23/2020 02:42 PM     Lab Results   Component Value Date/Time    PLATELET 340 94/47/6799 02:42 PM       Reviewed:  Past Medical History:   Diagnosis Date    Arthritis     BPH (benign prostatic hyperplasia)     Hyperlipidemia      Social History     Tobacco Use   Smoking Status Never Smoker   Smokeless Tobacco Never Used     Social History     Substance and Sexual Activity   Alcohol Use Not Currently    Frequency: Never     No Known Allergies    Current Outpatient Medications   Medication Sig    aspirin 81 mg chewable tablet Take 81 mg by mouth daily.     terazosin (HYTRIN) 2 mg capsule TAKE 1 CAPSULE BY MOUTH EVERY DAY AT NIGHT    fenofibrate (LOFIBRA) 160 mg tablet TAKE 1 TABLET BY MOUTH EVERY DAY    diclofenac (VOLTAREN) 1 % gel APPLY 2 GRAMS TO AFFECTED AREA FOUR (4) TIMES DAILY FOR 30 DAYS.  Omeprazole delayed release (PRILOSEC D/R) 20 mg tablet Take 1 Tab by mouth daily.  diclofenac EC (VOLTAREN) 75 mg EC tablet Take  by mouth.  fenofibrate nanocrystallized (TRICOR) 145 mg tablet Take 1 Tab by mouth daily. No current facility-administered medications for this visit.         MD Narayan Poe [facility] [place]  [facility] [address] [facility] [address]  [address]

## 2020-11-15 ENCOUNTER — DOCUMENTATION ONLY (OUTPATIENT)
Dept: CARDIOLOGY CLINIC | Age: 75
End: 2020-11-15

## 2020-11-15 DIAGNOSIS — N40.0 BENIGN PROSTATIC HYPERPLASIA WITHOUT LOWER URINARY TRACT SYMPTOMS: ICD-10-CM

## 2020-11-15 RX ORDER — TERAZOSIN 2 MG/1
CAPSULE ORAL
Qty: 30 CAP | Refills: 0 | Status: SHIPPED | OUTPATIENT
Start: 2020-11-15 | End: 2020-12-10

## 2020-11-16 NOTE — PROGRESS NOTES
holter 11/3/2020: Sinus rhythm with average heart rate 66 BPM, minimum of 44 BPM and a maximum of 132 BPM, occasional episodes of first and second degree av block Mobitz type I     There were frequent premature atrial contractions, atrial couplets and triplets and atrial tachycardia

## 2020-11-17 ENCOUNTER — TELEPHONE (OUTPATIENT)
Dept: CARDIOLOGY CLINIC | Age: 75
End: 2020-11-17

## 2020-11-17 NOTE — TELEPHONE ENCOUNTER
----- Message from Katie Valverde, RN sent at 11/17/2020 10:38 AM EST -----  Future Appointments    12/10/2020 4:20 PM    Arya Concepcion MD           [facility]  ----- Message -----  From: Arya Concepcion MD  Sent: 11/16/2020   7:22 AM EST  To: Derian Bravo MD, Marily Lovell, RN, #    Yes Adin  I let you know what is shows first  Will ask Geneva Coffey or Stephenie Green to contact   ----- Message -----  From: Panchito Barcenas MD  Sent: 11/15/2020   8:57 PM EST  To: Shani Sorto MD    x. If ok with you, can I have pt see you to discuss results? He was having palpitations and as you mentioned, can't really give medication for this. Let me know.  Thx Adin  ----- Message -----  From: Arya Concepcion MD  Sent: 11/15/2020   6:58 PM EST  To: Christiano Carias MD, MD Jayy Alfaro,  [facility] Medical service sent his holter 11/3/2020 to me to read:    Sinus rhythm with average heart rate 66 BPM, minimum of 44 BPM and a maximum of 132 BPM, occasional episodes of first and second degree av block Mobitz type I     There were frequent premature atrial contractions, atrial couplets and triplets and atrial tachycardia     If he is symptomatic with atrial tach he may need pacer since medical treatment will be limited by his bradycardia    [address] [facility] [address] [facility] [address]

## 2020-11-17 NOTE — TELEPHONE ENCOUNTER
Returned call to patient. Two patient indentifiers verified. Pt passed the phone to his daughter. Daughter was informed of the results and scheduled for a follow up with Dr. Sindhu Coughlin. Pt daughter was upset over not having the results when the patient was in the office. Pt daughter questioned other testing that has been ordered. Explained why other testing was ordered. Daughter asked for copy of monitor report. She was informed to call medical records at the hospital for a copy of the report. Future Appointments   Date Time Provider Sandra Boyd   11/23/2020  8:15 AM CARDIAC NUCLEAR STRESS Rosales Cheung [address] [facility]    11/23/2020  8:45 AM [facility] RM [facility]   11/23/2020 10:30 [facility]    12/10/2020  4:20 PM MD RADHA Chris AMB

## 2020-11-18 ENCOUNTER — TELEPHONE (OUTPATIENT)
Dept: PRIMARY CARE CLINIC | Age: 75
End: 2020-11-18

## 2020-11-18 NOTE — TELEPHONE ENCOUNTER
Call returned. Questions answered.   Message sent to Dr. Fer Nuno to explain his daughter about stress test. She is confusing it with pacemaker

## 2020-11-18 NOTE — TELEPHONE ENCOUNTER
Returned call to patient's daughter. She has questions about heart monitor results. She contacted Dr Victor M Weinberg office and was told to contact the PCP.    She also confirmed that patient has a procedure on 11/23 not an appointment with the cardiologist.   Keaton Jurado her I will get message to Dr Nba Clark

## 2020-11-23 ENCOUNTER — HOSPITAL ENCOUNTER (OUTPATIENT)
Dept: NON INVASIVE DIAGNOSTICS | Age: 75
Discharge: HOME | End: 2020-11-23
Attending: INTERNAL MEDICINE
Payer: MEDICARE

## 2020-11-23 ENCOUNTER — HOSPITAL ENCOUNTER (OUTPATIENT)
Dept: NUCLEAR MEDICINE | Age: 75
Discharge: HOME | End: 2020-11-23
Attending: INTERNAL MEDICINE
Payer: MEDICARE

## 2020-11-23 ENCOUNTER — TELEPHONE (OUTPATIENT)
Dept: CARDIOLOGY CLINIC | Age: 75
End: 2020-11-23

## 2020-11-23 VITALS
WEIGHT: 186 LBS | DIASTOLIC BLOOD PRESSURE: 76 MMHG | HEIGHT: 70 IN | BODY MASS INDEX: 26.63 KG/M2 | SYSTOLIC BLOOD PRESSURE: 147 MMHG

## 2020-11-23 VITALS
WEIGHT: 186 LBS | BODY MASS INDEX: 26.63 KG/M2 | SYSTOLIC BLOOD PRESSURE: 147 MMHG | DIASTOLIC BLOOD PRESSURE: 76 MMHG | HEIGHT: 70 IN

## 2020-11-23 DIAGNOSIS — R07.9 CHEST PAIN, UNSPECIFIED TYPE: ICD-10-CM

## 2020-11-23 LAB
ECHO AO ROOT DIAM: 3.46 CM
ECHO AV AREA PEAK VELOCITY: 3.07 CM2
ECHO AV AREA/BSA PEAK VELOCITY: 1.5 CM2/M2
ECHO AV PEAK GRADIENT: 4.52 MMHG
ECHO AV PEAK VELOCITY: 106.29 CM/S
ECHO LA AREA 4C: 17.32 CM2
ECHO LA MAJOR AXIS: 3.98 CM
ECHO LA MINOR AXIS: 1.97 CM
ECHO LA VOL 2C: 50.8 ML (ref 18–58)
ECHO LA VOL 4C: 41.62 ML (ref 18–58)
ECHO LA VOL BP: 51.48 ML (ref 18–58)
ECHO LA VOL/BSA BIPLANE: 25.44 ML/M2 (ref 16–28)
ECHO LA VOLUME INDEX A2C: 25.1 ML/M2 (ref 16–28)
ECHO LA VOLUME INDEX A4C: 20.56 ML/M2 (ref 16–28)
ECHO LV INTERNAL DIMENSION DIASTOLIC: 5.33 CM (ref 4.2–5.9)
ECHO LV INTERNAL DIMENSION SYSTOLIC: 3.36 CM
ECHO LV IVSD: 0.93 CM (ref 0.6–1)
ECHO LV MASS 2D: 182.6 G (ref 88–224)
ECHO LV MASS INDEX 2D: 90.2 G/M2 (ref 49–115)
ECHO LV POSTERIOR WALL DIASTOLIC: 0.92 CM (ref 0.6–1)
ECHO LVOT DIAM: 2.17 CM
ECHO LVOT PEAK GRADIENT: 3.09 MMHG
ECHO LVOT PEAK VELOCITY: 87.95 CM/S
ECHO MV A VELOCITY: 72.28 CM/S
ECHO MV AREA PHT: 2.57 CM2
ECHO MV E DECELERATION TIME (DT): 294.81 MS
ECHO MV E VELOCITY: 71.34 CM/S
ECHO MV E/A RATIO: 0.99
ECHO MV PRESSURE HALF TIME (PHT): 85.49 MS
ECHO PV PEAK INSTANTANEOUS GRADIENT SYSTOLIC: 5.97 MMHG
ECHO RV INTERNAL DIMENSION: 4.33 CM
ECHO RV TAPSE: 2.76 CM (ref 1.5–2)
ECHO TV REGURGITANT MAX VELOCITY: 257.46 CM/S
ECHO TV REGURGITANT PEAK GRADIENT: 26.51 MMHG
STRESS BASELINE HR: 58 BPM
STRESS ESTIMATED WORKLOAD: 1 METS
STRESS EXERCISE DUR MIN: NORMAL
STRESS PEAK DIAS BP: 76 MMHG
STRESS PEAK SYS BP: 147 MMHG
STRESS PERCENT HR ACHIEVED: 58 %
STRESS POST PEAK HR: 83 BPM
STRESS RATE PRESSURE PRODUCT: NORMAL BPM*MMHG
STRESS ST DEPRESSION: 0 MM
STRESS ST ELEVATION: 0 MM
STRESS TARGET HR: 144 BPM

## 2020-11-23 PROCEDURE — 93017 CV STRESS TEST TRACING ONLY: CPT

## 2020-11-23 PROCEDURE — 78452 HT MUSCLE IMAGE SPECT MULT: CPT

## 2020-11-23 PROCEDURE — 74011250636 HC RX REV CODE- 250/636: Performed by: INTERNAL MEDICINE

## 2020-11-23 PROCEDURE — 93306 TTE W/DOPPLER COMPLETE: CPT

## 2020-11-23 RX ORDER — SODIUM CHLORIDE 0.9 % (FLUSH) 0.9 %
20 SYRINGE (ML) INJECTION
Status: COMPLETED | OUTPATIENT
Start: 2020-11-23 | End: 2020-11-23

## 2020-11-23 RX ADMIN — Medication 20 ML: at 09:18

## 2020-11-23 RX ADMIN — REGADENOSON 0.4 MG: 0.08 INJECTION, SOLUTION INTRAVENOUS at 09:18

## 2020-11-23 NOTE — TELEPHONE ENCOUNTER
----- Message from Tino Crooks MD sent at 11/23/2020 11:54 AM EST -----  Please let pt know stress test was normal. thx  ----- Message -----  From: Symone Lord MD  Sent: 11/23/2020  11:29 AM EST  To: MD Khris Souza MD Sandria Mans, MD    Cc: Claudetta Nicolas, RN               Please let pt know echo was overall normal. thx

## 2020-12-01 ENCOUNTER — TELEPHONE (OUTPATIENT)
Dept: PRIMARY CARE CLINIC | Age: 75
End: 2020-12-01

## 2020-12-01 NOTE — TELEPHONE ENCOUNTER
Confirmed patient id. Patient states that he has not heard for Dr Fer Nuno and has tried calling to get his results. Patient was told to call Dr Fer Nuno and Dr Darlin Brewster to get his results and really wants to know why he has not heard the results of his nuclear cardiac stress test and why pcp did not call either. Advised that the results went to Dr Fer Nuno not to Dr Darlin Brewster.   Advised that I will send message about patient's frustration waiting to hear results from his stress test

## 2020-12-01 NOTE — TELEPHONE ENCOUNTER
Returned call to patient. Two patient indentifiers verified. Pt was informed of test results. Pt stated that he wants to MD to call with his test results not his nurse. Pt was informed that normal results are called by the nurse but that a message will be sent to MD. Pt questioned why he was scheduled to see Dr. Ana M Parikh. Pt was informed that he is seeing Dr. Ana M Parikh for the results of the loop monitor.

## 2020-12-01 NOTE — TELEPHONE ENCOUNTER
Pt calling in regards to test results.  Please advise      [facility]:[phone]

## 2020-12-02 NOTE — TELEPHONE ENCOUNTER
MD Sara Marie, MYA; Kwan Steele MD    Caller: Unspecified (1 week ago)               I spoke with pt and daughter in detail over the phone and answered all questions. Echo and stress test were overall normal. I am having him see Dr. Yuliet Lanier for abnormal heart monitor results. They will keep that appt on 12/10/20.  Thx

## 2020-12-07 DIAGNOSIS — K21.9 GASTROESOPHAGEAL REFLUX DISEASE WITHOUT ESOPHAGITIS: ICD-10-CM

## 2020-12-07 RX ORDER — PHENOL/SODIUM PHENOLATE
20 AEROSOL, SPRAY (ML) MUCOUS MEMBRANE DAILY
Qty: 30 TAB | Refills: 0 | Status: SHIPPED | OUTPATIENT
Start: 2020-12-07 | End: 2020-12-30

## 2020-12-07 NOTE — TELEPHONE ENCOUNTER
----- Message from Ran Alfonso sent at 12/7/2020  2:29 PM EST -----  Regarding: Dr. Sabrina Rich (if not patient): pt      Relationship of caller (if not patient): self      Best contact number(s): [phone]      Name of medication and dosage if known:  \"omeprazole 20 mg\"      Is patient out of this medication (yes/no): yes      Pharmacy name: [facility] [address] [address]    Pharmacy listed in chart? (yes/no): yes  Pharmacy phone number: [phone]       Details to clarify the request:      Ran Alfonso

## 2020-12-10 ENCOUNTER — VIRTUAL VISIT (OUTPATIENT)
Dept: CARDIOLOGY CLINIC | Age: 75
End: 2020-12-10
Payer: MEDICARE

## 2020-12-10 DIAGNOSIS — I44.1 SECOND DEGREE AV BLOCK, MOBITZ TYPE I: ICD-10-CM

## 2020-12-10 DIAGNOSIS — R00.2 HEART PALPITATIONS: Primary | ICD-10-CM

## 2020-12-10 DIAGNOSIS — I49.1 PAC (PREMATURE ATRIAL CONTRACTION): ICD-10-CM

## 2020-12-10 DIAGNOSIS — I47.1 PAT (PAROXYSMAL ATRIAL TACHYCARDIA) (HCC): ICD-10-CM

## 2020-12-10 DIAGNOSIS — N40.0 BENIGN PROSTATIC HYPERPLASIA WITHOUT LOWER URINARY TRACT SYMPTOMS: ICD-10-CM

## 2020-12-10 PROCEDURE — G8427 DOCREV CUR MEDS BY ELIG CLIN: HCPCS | Performed by: INTERNAL MEDICINE

## 2020-12-10 PROCEDURE — 1101F PT FALLS ASSESS-DOCD LE1/YR: CPT | Performed by: INTERNAL MEDICINE

## 2020-12-10 PROCEDURE — G0463 HOSPITAL OUTPT CLINIC VISIT: HCPCS | Performed by: INTERNAL MEDICINE

## 2020-12-10 PROCEDURE — G8419 CALC BMI OUT NRM PARAM NOF/U: HCPCS | Performed by: INTERNAL MEDICINE

## 2020-12-10 PROCEDURE — 99203 OFFICE O/P NEW LOW 30 MIN: CPT | Performed by: INTERNAL MEDICINE

## 2020-12-10 PROCEDURE — G8536 NO DOC ELDER MAL SCRN: HCPCS | Performed by: INTERNAL MEDICINE

## 2020-12-10 PROCEDURE — G8432 DEP SCR NOT DOC, RNG: HCPCS | Performed by: INTERNAL MEDICINE

## 2020-12-10 RX ORDER — TERAZOSIN 2 MG/1
CAPSULE ORAL
Qty: 30 CAP | Refills: 0 | Status: SHIPPED | OUTPATIENT
Start: 2020-12-10 | End: 2021-01-13

## 2020-12-10 NOTE — PROGRESS NOTES
Cardiac Electrophysiology VIRTUAL VISIT Note     Pursuant to the emergency declaration under the [facility], [facility] waiver authority and the [employer] Act, this Virtual  Visit was conducted, with patient's consent, to reduce the patient's risk of exposure to COVID-19 and provide continuity of care for an established patient. Services were provided through a video synchronous discussion virtually to substitute for in-person clinic visit. Subjective:      Sidra Morse is a 67 y.o. patient who is seen for evaluation of abnormal holter  He, his daughter and wife were present  He and she said when he got emotional he felt his rate fast and got uncomfortable but lasted for minutes then he was fine again   holter 11/3/2020: Sinus rhythm with average heart rate 66 BPM, minimum of 44 BPM and a maximum of 132 BPM, occasional episodes of first and second degree av block Mobitz type I  Nuclear stress test with Dr Negro Bustillo normal  11/23/20   ECHO ADULT COMPLETE 11/23/2020 11/23/2020    Narrative · LV: Calculated LVEF is 66%. Biplane method used to measure ejection   fraction. Normal cavity size, wall thickness and systolic function   (ejection fraction normal). Wall motion: normal.  · MV: Mild mitral valve regurgitation is present. Signed by: Manpreet Lagos MD          There were frequent premature atrial contractions, atrial couplets and triplets and atrial tachycardia on holter     Patient Active Problem List   Diagnosis Code    Benign prostatic hyperplasia without lower urinary tract symptoms N40.0    Arthritis M19.90    Gastroesophageal reflux disease without esophagitis K21.9    Mixed hyperlipidemia E78.2     Current Outpatient Medications   Medication Sig Dispense Refill    Omeprazole delayed release (PRILOSEC D/R) 20 mg tablet Take 1 Tab by mouth daily.  30 Tab 0    terazosin (HYTRIN) 2 mg capsule TAKE 1 CAPSULE BY MOUTH EVERY DAY AT NIGHT 30 Cap 0    diclofenac EC (VOLTAREN) 75 mg EC tablet Take  by mouth.  aspirin 81 mg chewable tablet Take 81 mg by mouth daily.  fenofibrate (LOFIBRA) 160 mg tablet TAKE 1 TABLET BY MOUTH EVERY DAY 90 Tab 0    diclofenac (VOLTAREN) 1 % gel APPLY 2 GRAMS TO AFFECTED AREA FOUR (4) TIMES DAILY FOR 30 DAYS. 100 g 0    fenofibrate nanocrystallized (TRICOR) 145 mg tablet Take 1 Tab by mouth daily. 30 Tab 0     No Known Allergies  Past Medical History:   Diagnosis Date    Arthritis     BPH (benign prostatic hyperplasia)     Hyperlipidemia      No past surgical history    No family history of pacemaker  Social History     Tobacco Use    Smoking status: Never Smoker    Smokeless tobacco: Never Used   Substance Use Topics    Alcohol use: Not Currently     Frequency: Never        Review of Systems:   Constitutional: Negative for fever, chills, weight loss, malaise/fatigue. HEENT: Negative for nosebleeds, vision changes. Respiratory: Negative for cough, hemoptysis  Cardiovascular: Negative for chest pain, + palpitations, no orthopnea, claudication, leg swelling, syncope, and PND. Gastrointestinal: Negative for nausea, vomiting, diarrhea, blood in stool and melena. Genitourinary: Negative for dysuria, and hematuria. Musculoskeletal: Negative for myalgias, arthralgia. Skin: Negative for rash. Heme: Does not bleed or bruise easily. Neurological: Negative for speech change and focal weakness     Objective:      Due to this being a TeleHealth evaluation, many elements of the physical examination are unable to be assessed. General: Well developed, in no acute distress, cooperative and alert  HEENT: Pupils equal/round. No marked JVD visible on video.   Respiratory: No audible wheezing, no signs of respiratory distress, lips non cyanotic  Extremities:  No edema  Neuro: A&Ox3, speech clear, no facial droop, answering questions appropriately  Skin: Skin color is normal. No rashes or lesions. Non diaphoretic on visible skin during exam    Assessment/Plan:        ICD-10-CM ICD-9-CM    1. Heart palpitations  R00.2 785.1    2. PAC (premature atrial contraction)  I49.1 427.61    3. PAT (paroxysmal atrial tachycardia) (HCC)  I47.1 427.0    4. Second degree AV block, Mobitz type I  I44.1 426.13      holter 11/3/2020: Sinus rhythm with average heart rate 66 BPM, minimum of 44 BPM and a maximum of 132 BPM, occasional episodes of first and second degree av block Mobitz type I  There were frequent premature atrial contractions, atrial couplets and triplets and atrial tachycardia     He appears symptomatic and can be treated with medication and pacemaker or ablation of atrial tachycardia  He and his daughter said he will wait and does not want anything done at this time  No future appointments. with me but can follow up with Dr Joel Infante and Dr Bea Javier  He can call me if symptoms get more severe   His daughter agrees    Thank you for involving me in this patient's care and please call with further concerns or questions. Breanna Gomez M.D.   Electrophysiology/Cardiology  [facility]  [address] [place] [address]                             [phone]

## 2020-12-14 DIAGNOSIS — E78.2 MIXED HYPERLIPIDEMIA: ICD-10-CM

## 2020-12-14 RX ORDER — FENOFIBRATE 160 MG/1
TABLET ORAL
Qty: 90 TAB | Refills: 0 | Status: SHIPPED | OUTPATIENT
Start: 2020-12-14 | End: 2021-03-24 | Stop reason: SDUPTHER

## 2020-12-30 DIAGNOSIS — K21.9 GASTROESOPHAGEAL REFLUX DISEASE WITHOUT ESOPHAGITIS: ICD-10-CM

## 2020-12-30 RX ORDER — OMEPRAZOLE 20 MG/1
CAPSULE, DELAYED RELEASE ORAL
Qty: 30 CAP | Refills: 0 | Status: SHIPPED | OUTPATIENT
Start: 2020-12-30 | End: 2021-01-28 | Stop reason: SDUPTHER

## 2021-01-13 DIAGNOSIS — N40.0 BENIGN PROSTATIC HYPERPLASIA WITHOUT LOWER URINARY TRACT SYMPTOMS: ICD-10-CM

## 2021-01-13 RX ORDER — TERAZOSIN 2 MG/1
CAPSULE ORAL
Qty: 30 CAP | Refills: 0 | Status: SHIPPED | OUTPATIENT
Start: 2021-01-13 | End: 2021-02-08

## 2021-01-28 DIAGNOSIS — K21.9 GASTROESOPHAGEAL REFLUX DISEASE WITHOUT ESOPHAGITIS: ICD-10-CM

## 2021-01-28 RX ORDER — OMEPRAZOLE 20 MG/1
CAPSULE, DELAYED RELEASE ORAL
Qty: 30 CAP | Refills: 0 | Status: SHIPPED | OUTPATIENT
Start: 2021-01-28 | End: 2021-02-17 | Stop reason: SDUPTHER

## 2021-02-07 DIAGNOSIS — N40.0 BENIGN PROSTATIC HYPERPLASIA WITHOUT LOWER URINARY TRACT SYMPTOMS: ICD-10-CM

## 2021-02-08 RX ORDER — TERAZOSIN 2 MG/1
CAPSULE ORAL
Qty: 30 CAP | Refills: 0 | Status: SHIPPED | OUTPATIENT
Start: 2021-02-08 | End: 2021-03-13

## 2021-02-17 DIAGNOSIS — K21.9 GASTROESOPHAGEAL REFLUX DISEASE WITHOUT ESOPHAGITIS: ICD-10-CM

## 2021-02-17 RX ORDER — OMEPRAZOLE 20 MG/1
CAPSULE, DELAYED RELEASE ORAL
Qty: 30 CAP | Refills: 0 | Status: SHIPPED | OUTPATIENT
Start: 2021-02-17 | End: 2021-04-22

## 2021-03-13 DIAGNOSIS — N40.0 BENIGN PROSTATIC HYPERPLASIA WITHOUT LOWER URINARY TRACT SYMPTOMS: ICD-10-CM

## 2021-03-13 RX ORDER — TERAZOSIN 2 MG/1
CAPSULE ORAL
Qty: 30 CAP | Refills: 0 | Status: SHIPPED | OUTPATIENT
Start: 2021-03-13 | End: 2021-03-31 | Stop reason: SDUPTHER

## 2021-03-24 DIAGNOSIS — E78.2 MIXED HYPERLIPIDEMIA: ICD-10-CM

## 2021-03-25 RX ORDER — FENOFIBRATE 160 MG/1
TABLET ORAL
Qty: 90 TAB | Refills: 0 | Status: SHIPPED | OUTPATIENT
Start: 2021-03-25 | End: 2021-04-22

## 2021-03-31 DIAGNOSIS — N40.0 BENIGN PROSTATIC HYPERPLASIA WITHOUT LOWER URINARY TRACT SYMPTOMS: ICD-10-CM

## 2021-03-31 RX ORDER — TERAZOSIN 2 MG/1
CAPSULE ORAL
Qty: 30 CAP | Refills: 0 | Status: SHIPPED | OUTPATIENT
Start: 2021-03-31 | End: 2021-04-22 | Stop reason: SDUPTHER

## 2021-04-22 DIAGNOSIS — K21.9 GASTROESOPHAGEAL REFLUX DISEASE WITHOUT ESOPHAGITIS: ICD-10-CM

## 2021-04-22 DIAGNOSIS — E78.2 MIXED HYPERLIPIDEMIA: ICD-10-CM

## 2021-04-22 DIAGNOSIS — N40.0 BENIGN PROSTATIC HYPERPLASIA WITHOUT LOWER URINARY TRACT SYMPTOMS: ICD-10-CM

## 2021-04-22 RX ORDER — OMEPRAZOLE 20 MG/1
CAPSULE, DELAYED RELEASE ORAL
Qty: 30 CAP | Refills: 0 | Status: SHIPPED | OUTPATIENT
Start: 2021-04-22 | End: 2021-06-19

## 2021-04-22 RX ORDER — TERAZOSIN 2 MG/1
CAPSULE ORAL
Qty: 30 CAP | Refills: 0 | Status: SHIPPED | OUTPATIENT
Start: 2021-04-22 | End: 2021-06-15

## 2021-04-22 RX ORDER — OMEPRAZOLE 20 MG/1
CAPSULE, DELAYED RELEASE ORAL
Qty: 30 CAP | Refills: 0 | Status: CANCELLED | OUTPATIENT
Start: 2021-04-22

## 2021-04-22 RX ORDER — FENOFIBRATE 160 MG/1
TABLET ORAL
Qty: 90 TAB | Refills: 0 | Status: SHIPPED | OUTPATIENT
Start: 2021-04-22 | End: 2021-07-13 | Stop reason: SDUPTHER

## 2021-04-22 NOTE — TELEPHONE ENCOUNTER
Requested Prescriptions     Pending Prescriptions Disp Refills    terazosin (HYTRIN) 2 mg capsule 30 Cap 0     Sig: TAKE 1 CAPSULE BY MOUTH EVERY DAY AT NIGHT        Last Visit 11/2/20  Last Refill 3/31/21

## 2021-06-19 DIAGNOSIS — K21.9 GASTROESOPHAGEAL REFLUX DISEASE WITHOUT ESOPHAGITIS: ICD-10-CM

## 2021-06-19 RX ORDER — OMEPRAZOLE 20 MG/1
CAPSULE, DELAYED RELEASE ORAL
Qty: 30 CAPSULE | Refills: 0 | Status: SHIPPED | OUTPATIENT
Start: 2021-06-19 | End: 2021-07-13 | Stop reason: SDUPTHER

## 2021-07-13 ENCOUNTER — HOSPITAL ENCOUNTER (OUTPATIENT)
Dept: GENERAL RADIOLOGY | Age: 76
Discharge: HOME | End: 2021-07-13
Attending: INTERNAL MEDICINE
Payer: MEDICARE

## 2021-07-13 ENCOUNTER — OFFICE VISIT (OUTPATIENT)
Dept: PRIMARY CARE CLINIC | Age: 76
End: 2021-07-13
Payer: MEDICARE

## 2021-07-13 VITALS
OXYGEN SATURATION: 98 % | TEMPERATURE: 98 F | WEIGHT: 180 LBS | HEIGHT: 70 IN | DIASTOLIC BLOOD PRESSURE: 72 MMHG | RESPIRATION RATE: 18 BRPM | HEART RATE: 60 BPM | SYSTOLIC BLOOD PRESSURE: 115 MMHG | BODY MASS INDEX: 25.77 KG/M2

## 2021-07-13 DIAGNOSIS — D17.1 LIPOMA OF BACK: ICD-10-CM

## 2021-07-13 DIAGNOSIS — N40.0 BENIGN PROSTATIC HYPERPLASIA WITHOUT LOWER URINARY TRACT SYMPTOMS: ICD-10-CM

## 2021-07-13 DIAGNOSIS — M25.511 ACUTE PAIN OF RIGHT SHOULDER: ICD-10-CM

## 2021-07-13 DIAGNOSIS — K21.9 GASTROESOPHAGEAL REFLUX DISEASE WITHOUT ESOPHAGITIS: ICD-10-CM

## 2021-07-13 DIAGNOSIS — E78.2 MIXED HYPERLIPIDEMIA: ICD-10-CM

## 2021-07-13 DIAGNOSIS — M25.511 ACUTE PAIN OF RIGHT SHOULDER: Primary | ICD-10-CM

## 2021-07-13 DIAGNOSIS — M54.50 ACUTE RIGHT-SIDED LOW BACK PAIN WITHOUT SCIATICA: ICD-10-CM

## 2021-07-13 DIAGNOSIS — M17.0 PRIMARY OSTEOARTHRITIS OF BOTH KNEES: ICD-10-CM

## 2021-07-13 DIAGNOSIS — H00.011 HORDEOLUM EXTERNUM OF RIGHT UPPER EYELID: ICD-10-CM

## 2021-07-13 DIAGNOSIS — Z11.59 NEED FOR HEPATITIS C SCREENING TEST: ICD-10-CM

## 2021-07-13 PROCEDURE — 73030 X-RAY EXAM OF SHOULDER: CPT

## 2021-07-13 PROCEDURE — G8536 NO DOC ELDER MAL SCRN: HCPCS | Performed by: INTERNAL MEDICINE

## 2021-07-13 PROCEDURE — 1101F PT FALLS ASSESS-DOCD LE1/YR: CPT | Performed by: INTERNAL MEDICINE

## 2021-07-13 PROCEDURE — 72100 X-RAY EXAM L-S SPINE 2/3 VWS: CPT

## 2021-07-13 PROCEDURE — G8427 DOCREV CUR MEDS BY ELIG CLIN: HCPCS | Performed by: INTERNAL MEDICINE

## 2021-07-13 PROCEDURE — G8419 CALC BMI OUT NRM PARAM NOF/U: HCPCS | Performed by: INTERNAL MEDICINE

## 2021-07-13 PROCEDURE — G8510 SCR DEP NEG, NO PLAN REQD: HCPCS | Performed by: INTERNAL MEDICINE

## 2021-07-13 PROCEDURE — 99214 OFFICE O/P EST MOD 30 MIN: CPT | Performed by: INTERNAL MEDICINE

## 2021-07-13 RX ORDER — GUAIFENESIN 100 MG/5ML
81 LIQUID (ML) ORAL DAILY
Qty: 90 TABLET | Refills: 0 | Status: SHIPPED | OUTPATIENT
Start: 2021-07-13 | End: 2021-10-06

## 2021-07-13 RX ORDER — TERAZOSIN 2 MG/1
CAPSULE ORAL
Qty: 90 CAPSULE | Refills: 0 | Status: SHIPPED | OUTPATIENT
Start: 2021-07-13 | End: 2021-10-28

## 2021-07-13 RX ORDER — FENOFIBRATE 160 MG/1
TABLET ORAL
Qty: 90 TABLET | Refills: 0 | Status: SHIPPED | OUTPATIENT
Start: 2021-07-13 | End: 2021-11-03

## 2021-07-13 RX ORDER — DICLOFENAC SODIUM 10 MG/G
2 GEL TOPICAL 4 TIMES DAILY
Qty: 100 G | Refills: 1 | Status: SHIPPED | OUTPATIENT
Start: 2021-07-13 | End: 2021-08-12

## 2021-07-13 RX ORDER — OMEPRAZOLE 20 MG/1
CAPSULE, DELAYED RELEASE ORAL
Qty: 90 CAPSULE | Refills: 0 | Status: SHIPPED | OUTPATIENT
Start: 2021-07-13 | End: 2021-10-06

## 2021-07-13 NOTE — PROGRESS NOTES
Chief Complaint   Patient presents with    Shoulder Pain     right side, right hip post fall last week    Knee Pain     left knee

## 2021-07-13 NOTE — PROGRESS NOTES
Francesca Carlson (: 1945) is a 67 y.o. male, established patient, here for evaluation of the following chief complaint(s):  Shoulder Pain (right side, right hip post fall last week) and Knee Pain (left knee)   Written by Elfego Julian, as dictated by Dr. Millie Jaffe MD.      ASSESSMENT/PLAN:  Below is the assessment and plan developed based on review of pertinent history, physical exam, labs, studies, and medications. 1. Acute pain of right shoulder  Followed by Orthopedics. Referred to PT to evaluate R shoulder pain. Can get shoulder Xray if PT does not improve pain. -     REFERRAL TO PHYSICAL THERAPY  -     XR SHOULDER RT AP/LAT MIN 2 V; Future    2. Acute right-sided low back pain without sciatica  Followed by Orthopedics. Referred to PT to evaluate low back pain. Can get spinal Xray if PT does not improve pain. Ordered urinalysis. Waiting for results.  -     REFERRAL TO PHYSICAL THERAPY  -     XR SPINE LUMB 2 OR 3 V; Future  -     URINALYSIS W/MICROSCOPIC; Future    3. Benign prostatic hyperplasia without lower urinary tract symptoms  Continue taking terazosin 2 mg as prescribed. -     terazosin (HYTRIN) 2 mg capsule; TAKE 1 CAPSULE BY MOUTH EVERY DAY AT NIGHT, Normal, Disp-90 Capsule, R-0 sent to pharmacy. 4. Gastroesophageal reflux disease without esophagitis  Refilled aspirin 81 mg and Prilosec 20 mg. Take medication as prescribed. -     omeprazole (PRILOSEC) 20 mg capsule; TAKE 1 CAPSULE BY MOUTH EVERY DAY, Normal, Disp-90 Capsule, R-0 sent to pharmacy. -     aspirin 81 mg chewable tablet; Take 1 Tablet by mouth daily. Take 81 mg by mouth daily. , Normal, Disp-90 Tablet, R-0 sent to pharmacy. 5. Mixed hyperlipidemia  Refilled Lofibra 160 mg. Continue to take as prescribed. Ordered lab work to check metabolic panel, CBC levels, and Lipid panel. Waiting for results.    -     fenofibrate (LOFIBRA) 160 mg tablet; TAKE 1 TABLET BY MOUTH EVERY DAY, Normal, Disp-90 Tablet, R-0 sent to pharmacy.   -     METABOLIC PANEL, COMPREHENSIVE; Future  -     CBC W/O DIFF; Future  -     LIPID PANEL; Future    6. Primary osteoarthritis of both knees  Apply Voltaren 1% gel on BL knees as directed. -     diclofenac (VOLTAREN) 1 % gel; Apply 2 g to affected area four (4) times daily for 30 days. , Normal, Disp-100 g, R-1 sent to pharmacy. 7. Need for hepatitis C screening test  Ordered Hepatitis C test. Waiting for results.   -     HEPATITIS C AB; Future    8. Lipoma of back  Followed by Orthopedics. Referred to PT to evaluate lower back pain. Can get spinal Xray if PT does not improve pain. 9. Hordeolum externum of right upper eyelid  Apply warm compress or towel wet with warm water to R eye. SUBJECTIVE/OBJECTIVE:  HPI  Patient presents today for a routine visit. His wife  2021, and he lives alone. He has a support system with his family and children. He takes aspirin 81 mg and Prilosec 20 mg for GERD sxs. He takes terazosin for enlarged prostate, and continues to have nocturia sxs. He c/o lower back, R shoulder, and L knee pain. He has difficulty sleeping at night due to back and shoulder pain. He takes 2 tabs Extra Strength Tylenol in the PM with minimal relief. He also uses Voltaren 1% gel on R hip and L knee which helps. He is followed by Orthopedics for knee pain, and is planning to start PT soon. He c/o red stye on upper lid in R eye for the past 4 days.   Patient Active Problem List   Diagnosis Code    Benign prostatic hyperplasia without lower urinary tract symptoms N40.0    Arthritis M19.90    Gastroesophageal reflux disease without esophagitis K21.9    Mixed hyperlipidemia E78.2        Current Outpatient Medications on File Prior to Visit   Medication Sig Dispense Refill    [DISCONTINUED] omeprazole (PRILOSEC) 20 mg capsule TAKE 1 CAPSULE BY MOUTH EVERY DAY 30 Capsule 0    [DISCONTINUED] terazosin (HYTRIN) 2 mg capsule TAKE 1 CAPSULE BY MOUTH EVERY DAY AT NIGHT 60 Capsule 0    [DISCONTINUED] fenofibrate (LOFIBRA) 160 mg tablet TAKE 1 TABLET BY MOUTH EVERY DAY 90 Tab 0    [DISCONTINUED] diclofenac EC (VOLTAREN) 75 mg EC tablet Take  by mouth.  [DISCONTINUED] aspirin 81 mg chewable tablet Take 81 mg by mouth daily.  [DISCONTINUED] diclofenac (VOLTAREN) 1 % gel APPLY 2 GRAMS TO AFFECTED AREA FOUR (4) TIMES DAILY FOR 30 DAYS. 100 g 0    [DISCONTINUED] fenofibrate nanocrystallized (TRICOR) 145 mg tablet Take 1 Tab by mouth daily. 30 Tab 0     No current facility-administered medications on file prior to visit. No Known Allergies    Past Medical History:   Diagnosis Date    Arthritis     BPH (benign prostatic hyperplasia)     Hyperlipidemia        No past surgical history on file. No family history on file. Social History     Socioeconomic History    Marital status:      Spouse name: Not on file    Number of children: Not on file    Years of education: Not on file    Highest education level: Not on file   Occupational History    Not on file   Tobacco Use    Smoking status: Never Smoker    Smokeless tobacco: Never Used   Substance and Sexual Activity    Alcohol use: Not Currently    Drug use: Never    Sexual activity: Not on file   Other Topics Concern    Not on file   Social History Narrative    Not on file     Social Determinants of Health     Financial Resource Strain:     Difficulty of Paying Living Expenses:    Food Insecurity:     Worried About Running Out of Food in the Last Year:     [address] in the Last Year:    Transportation Needs:     Lack of Transportation (Medical):      Lack of Transportation (Non-Medical):    Physical Activity:     Days of Exercise per Week:     Minutes of Exercise per Session:    Stress:     Feeling of Stress :    Social Connections:     Frequency of Communication with Friends and Family:     Frequency of Social Gatherings with Friends and Family:     Attends Confucianist Services:     Active Member of Clubs or Organizations:     Attends Club or Organization Meetings:     Marital Status:    Intimate Partner Violence:     Fear of Current or Ex-Partner:     Emotionally Abused:     Physically Abused:     Sexually Abused:        No visits with results within 3 Month(s) from this visit.    Latest known visit with results is:   Hospital Outpatient Visit on 11/23/2020   Component Date Value Ref Range Status    IVSd 11/23/2020 0.93  0.60 - 1.00 cm Final    LVIDd 11/23/2020 5.33  4.20 - 5.90 cm Final    LVIDs 11/23/2020 3.36  cm Final    LVOT d 11/23/2020 2.17  cm Final    LVPWd 11/23/2020 0.92  0.60 - 1.00 cm Final    LVOT Peak Gradient 11/23/2020 3.09  mmHg Final    LVOT Peak Velocity 11/23/2020 87.95  cm/s Final    RVIDd 11/23/2020 4.33  cm Final    Left Atrium Major Axis 11/23/2020 3.98  cm Final    LA Volume 11/23/2020 51.48  18.0 - 58.0 mL Final    LA Area 4C 11/23/2020 17.32  cm2 Final    LA Vol 2C 11/23/2020 50.80  18.00 - 58.00 mL Final    LA Vol 4C 11/23/2020 41.62  18.00 - 58.00 mL Final    Aortic Valve Area by Continuity of* 11/23/2020 3.07  cm2 Final    AoV PG 11/23/2020 4.52  mmHg Final    Aortic Valve Systolic Peak Velocity 94/35/4730 106.29  cm/s Final    MV A Demar 11/23/2020 72.28  cm/s Final    Mitral Valve E Wave Deceleration T* 11/23/2020 294.81  ms Final    MV E Demar 11/23/2020 71.34  cm/s Final    Mitral Valve Pressure Half-time 11/23/2020 85.49  ms Final    MVA (PHT) 11/23/2020 2.57  cm2 Final    Pulmonic Valve Systolic Peak Insta* 06/83/9355 5.97  mmHg Final    Tapse 11/23/2020 2.76* 1.50 - 2.00 cm Final    Triscuspid Valve Regurgitation Pea* 11/23/2020 26.51  mmHg Final    TR Max Velocity 11/23/2020 257.46  cm/s Final    Ao Root D 11/23/2020 3.46  cm Final    MV E/A 11/23/2020 0.99   Final    LV Mass AL 11/23/2020 182.6  88.0 - 224.0 g Final    LV Mass AL Index 11/23/2020 90.2  49.0 - 115.0 g/m2 Final    Left Atrium Minor Axis 11/23/2020 1.97  cm Final    LA Vol Index 11/23/2020 25.44  16.00 - 28.00 ml/m2 Final    LA Vol Index 11/23/2020 25.10  16.00 - 28.00 ml/m2 Final    LA Vol Index 11/23/2020 20.56  16.00 - 28.00 ml/m2 Final    VERONA/BSA Pk Demar 11/23/2020 1.5  cm2/m2 Final       Review of Systems   Constitutional: Negative for activity change, fatigue and unexpected weight change. HENT: Negative for congestion, ear discharge, ear pain, hearing loss, rhinorrhea and sore throat. Eyes: Negative for pain, discharge and redness. Stye   Respiratory: Negative for cough, chest tightness and shortness of breath. Cardiovascular: Negative for leg swelling. Gastrointestinal: Negative for abdominal pain, constipation and diarrhea. Endocrine: Negative for polyuria. Genitourinary: Negative for dysuria, flank pain and urgency. Musculoskeletal: Positive for arthralgias and back pain. Negative for myalgias. Skin: Negative for color change. Neurological: Negative for dizziness, light-headedness and headaches. Psychiatric/Behavioral: Negative for dysphoric mood and sleep disturbance. The patient is not nervous/anxious. Visit Vitals  /72 (BP 1 Location: Left upper arm, BP Patient Position: Sitting)   Pulse 60   Temp 98 °F (36.7 °C) (Temporal)   Resp 18   Ht 5' 10\" (1.778 m)   Wt 180 lb (81.6 kg)   SpO2 98%   BMI 25.83 kg/m²       Physical Exam  Vitals and nursing note reviewed. Constitutional:       General: He is not in acute distress. Appearance: Normal appearance. He is well-developed. He is not diaphoretic. HENT:      Head: Normocephalic and atraumatic. Right Ear: External ear normal.      Left Ear: External ear normal.      Mouth/Throat:      Mouth: Mucous membranes are moist.      Pharynx: Oropharynx is clear. Eyes:      General: No scleral icterus. Right eye: No discharge. Left eye: No discharge. Extraocular Movements: Extraocular movements intact.       Conjunctiva/sclera: Conjunctivae normal.      Pupils: Pupils are equal, round, and reactive to light. Cardiovascular:      Rate and Rhythm: Normal rate and regular rhythm. Pulses: Normal pulses. Heart sounds: Normal heart sounds. Pulmonary:      Effort: Pulmonary effort is normal.      Breath sounds: Normal breath sounds. No wheezing. Musculoskeletal:      Right lower leg: No edema. Left lower leg: No edema. Comments:  lipoma on mid-lower back, mild tenderness   Neurological:      Mental Status: He is alert and oriented to person, place, and time. Psychiatric:         Mood and Affect: Mood and affect normal.               An electronic signature was used to authenticate this note.   -- Darcy Oakley

## 2021-07-15 NOTE — PROGRESS NOTES
Let him know shoulder x-ray showed arthritis and there is a narrowing of joint space. If no improvement with physical therapy I would suggest seeing an orthopedic surgeon.

## 2021-08-03 ENCOUNTER — TELEPHONE (OUTPATIENT)
Dept: PRIMARY CARE CLINIC | Age: 76
End: 2021-08-03

## 2021-08-03 NOTE — TELEPHONE ENCOUNTER
----- Message from Karla Flannery sent at 8/3/2021  2:14 PM EDT -----  Regarding: Dr. Bridger Patel first and last name: Naomi Dugan, Daughter. Reason for call: Calling for lab results. Callback required yes/no and why: Yes, call back from pcp or nurse. Best contact number(s): Said to call number on file, would not provide number. Details to clarify the request: N/a.       Karla Flannery

## 2021-08-04 DIAGNOSIS — R35.1 BPH ASSOCIATED WITH NOCTURIA: Primary | ICD-10-CM

## 2021-08-04 DIAGNOSIS — N40.1 BPH ASSOCIATED WITH NOCTURIA: Primary | ICD-10-CM

## 2021-09-27 ENCOUNTER — OFFICE VISIT (OUTPATIENT)
Dept: PRIMARY CARE CLINIC | Age: 76
End: 2021-09-27
Payer: MEDICARE

## 2021-09-27 VITALS
HEART RATE: 50 BPM | HEIGHT: 70 IN | BODY MASS INDEX: 25.86 KG/M2 | WEIGHT: 180.6 LBS | TEMPERATURE: 97.7 F | OXYGEN SATURATION: 98 % | SYSTOLIC BLOOD PRESSURE: 155 MMHG | DIASTOLIC BLOOD PRESSURE: 86 MMHG | RESPIRATION RATE: 16 BRPM

## 2021-09-27 DIAGNOSIS — Z23 NEED FOR VACCINATION FOR STREP PNEUMONIAE: ICD-10-CM

## 2021-09-27 DIAGNOSIS — N40.1 BENIGN PROSTATIC HYPERPLASIA (BPH) WITH URINARY URGENCY: ICD-10-CM

## 2021-09-27 DIAGNOSIS — I10 ESSENTIAL HYPERTENSION: ICD-10-CM

## 2021-09-27 DIAGNOSIS — M54.50 CHRONIC MIDLINE LOW BACK PAIN WITHOUT SCIATICA: ICD-10-CM

## 2021-09-27 DIAGNOSIS — R00.2 PALPITATIONS: ICD-10-CM

## 2021-09-27 DIAGNOSIS — Z71.89 ACP (ADVANCE CARE PLANNING): ICD-10-CM

## 2021-09-27 DIAGNOSIS — R39.15 BENIGN PROSTATIC HYPERPLASIA (BPH) WITH URINARY URGENCY: ICD-10-CM

## 2021-09-27 DIAGNOSIS — Z00.00 MEDICARE ANNUAL WELLNESS VISIT, SUBSEQUENT: Primary | ICD-10-CM

## 2021-09-27 DIAGNOSIS — G89.29 CHRONIC MIDLINE LOW BACK PAIN WITHOUT SCIATICA: ICD-10-CM

## 2021-09-27 PROCEDURE — G0439 PPPS, SUBSEQ VISIT: HCPCS | Performed by: INTERNAL MEDICINE

## 2021-09-27 PROCEDURE — G8419 CALC BMI OUT NRM PARAM NOF/U: HCPCS | Performed by: INTERNAL MEDICINE

## 2021-09-27 PROCEDURE — G8427 DOCREV CUR MEDS BY ELIG CLIN: HCPCS | Performed by: INTERNAL MEDICINE

## 2021-09-27 PROCEDURE — G8536 NO DOC ELDER MAL SCRN: HCPCS | Performed by: INTERNAL MEDICINE

## 2021-09-27 PROCEDURE — 93000 ELECTROCARDIOGRAM COMPLETE: CPT | Performed by: INTERNAL MEDICINE

## 2021-09-27 PROCEDURE — 1101F PT FALLS ASSESS-DOCD LE1/YR: CPT | Performed by: INTERNAL MEDICINE

## 2021-09-27 PROCEDURE — G0009 ADMIN PNEUMOCOCCAL VACCINE: HCPCS | Performed by: INTERNAL MEDICINE

## 2021-09-27 PROCEDURE — G8432 DEP SCR NOT DOC, RNG: HCPCS | Performed by: INTERNAL MEDICINE

## 2021-09-27 PROCEDURE — 90732 PPSV23 VACC 2 YRS+ SUBQ/IM: CPT | Performed by: INTERNAL MEDICINE

## 2021-09-27 PROCEDURE — 99214 OFFICE O/P EST MOD 30 MIN: CPT | Performed by: INTERNAL MEDICINE

## 2021-09-27 RX ORDER — AMLODIPINE BESYLATE 5 MG/1
5 TABLET ORAL DAILY
Qty: 90 TABLET | Refills: 0 | Status: SHIPPED | OUTPATIENT
Start: 2021-09-27 | End: 2021-10-05

## 2021-09-27 NOTE — PROGRESS NOTES
Chief Complaint   Patient presents with   [facility] Annual Wellness Visit     Urologist put on mediation- pt reports stopping due to \"heart flutters\"- fell and will need a referral to back specialist, reoccuring knee pain       Visit Vitals  BP (!) 160/77 (BP 1 Location: Left arm)   Pulse (!) 50   Temp (!) 96.6 °F (35.9 °C)   Resp 16   Ht 5' 10\" (1.778 m)   Wt 180 lb 9.6 oz (81.9 kg)   SpO2 98%   BMI 25.91 kg/m²       1. Have you been to the ER, urgent care clinic since your last visit? Hospitalized since your last visit? Yes [facility] for fall    2. Have you seen or consulted any other health care providers outside of the [address] [place] [address] since your last visit? Include any pap smears or colon screening. Yes Physical therapy, and Urologist            Otilia Christopher is a 67 y.o. male and presents for Annual Medicare Wellness Visit. Assessment of cognitive impairment: Alert and oriented x 4. Depression Screen:   3 most recent PHQ Screens 7/13/2021   Little interest or pleasure in doing things Not at all   Feeling down, depressed, irritable, or hopeless Not at all   Total Score PHQ 2 0       Fall Risk Assessment:    Fall Risk Assessment, last 12 mths 9/27/2021   Able to walk? Yes   Fall in past 12 months? 1   Do you feel unsteady? 0   Are you worried about falling 1   Is TUG test greater than 12 seconds? 0   Is the gait abnormal? 0   Number of falls in past 12 months 1   Fall with injury? 1       Abuse Screen:   Abuse Screening Questionnaire 9/23/2020   Do you ever feel afraid of your partner? N   Are you in a relationship with someone who physically or mentally threatens you? N   Is it safe for you to go home? Y       Activities of Daily Living:  Self-care.    Requires assistance with: no ADLs  Patient handle his/her own medications  yes Use of pill box  yes  Activities of Daily Living:   ADL Assessment 9/27/2021   Feeding yourself No Help Needed   Getting from bed to chair No Help Needed   Getting dressed No Help Needed   Bathing or showering No Help Needed   Walk across the room (includes cane/walker) No Help Needed   Using the telphone No Help Needed   Taking your medications No Help Needed   Preparing meals No Help Needed   Managing money (expenses/bills) No Help Needed   Moderately strenuous housework (laundry) No Help Needed   Shopping for personal items (toiletries/medicines) No Help Needed   Shopping for groceries No Help Needed   Driving No Help Needed   Climbing a flight of stairs No Help Needed   Getting to places beyond walking distances No Help Needed       Health Maintenance:  Daily Aspirin: yes   Bone Density: N/a  Glaucoma Screening: yes May 11, 2021  Immunizations:    Tetanus: unknown. will find out from previous PCP office   Influenza: up to date. Shingles: not up to date - think he got in 2016   PPSV-23: first one in 2016 , will give second dose today  Prevnar-13: unknown,    Cancer screening:     Colon: 2017. Prostate:  up to date,  followed by Urology    Alcohol Risk Screen:   On any occasion during the past 3 months, have you had more than 3 drinks(female) or 4 drinks (male) containing alcohol in one? No  Do you average more than 7 drinks (female) or 14 drinks (male) per week? No  Type and amount:does not drink    Hearing Loss:  Hearing is good. denies any hearing loss wears hearing aides    Vision Loss:   Wears glasses, contact lenses, or have any other visual impairment  yes    Adult Nutrition Screen:  No risk factors noted. Advance Care Planning:   End of Life Planning: has NO advanced directive  - add't info requested. Referral to : yes,  Brock Yeung ACP-Facilitator appointment yes      Medications/Allergies: Reviewed with patient  Prior to Admission medications    Medication Sig Start Date End Date Taking?  Authorizing Provider   terazosin (HYTRIN) 2 mg capsule TAKE 1 CAPSULE BY MOUTH EVERY DAY AT NIGHT 7/13/21  Yes Dileep Friend MD   omeprazole (PRILOSEC) 20 mg capsule TAKE 1 CAPSULE BY MOUTH EVERY DAY 7/13/21  Yes Daysi Nair MD   fenofibrate (LOFIBRA) 160 mg tablet TAKE 1 TABLET BY MOUTH EVERY DAY 7/13/21  Yes Daysi Nair MD   aspirin 81 mg chewable tablet Take 1 Tablet by mouth daily. Take 81 mg by mouth daily. 7/13/21  Yes Daysi Nair MD       No Known Allergies    Objective:  Visit Vitals  BP (!) 160/77 (BP 1 Location: Left arm)   Pulse (!) 50   Temp (!) 96.6 °F (35.9 °C)   Resp 16   Ht 5' 10\" (1.778 m)   Wt 180 lb 9.6 oz (81.9 kg)   SpO2 98%   BMI 25.91 kg/m²    Body mass index is 25.91 kg/m². Problem List: Reviewed with patient and discussed risk factors. Patient Active Problem List   Diagnosis Code    Benign prostatic hyperplasia without lower urinary tract symptoms N40.0    Arthritis M19.90    Gastroesophageal reflux disease without esophagitis K21.9    Mixed hyperlipidemia E78.2       PSH: Reviewed with patient  History reviewed. No pertinent surgical history. SH: Reviewed with patient  Social History     Tobacco Use    Smoking status: Never Smoker    Smokeless tobacco: Never Used   Vaping Use    Vaping Use: Never used   Substance Use Topics    Alcohol use: Not Currently    Drug use: Never       FH: Reviewed with patient  History reviewed. No pertinent family history. Current medical providers:    Patient Care Team:  Daysi Nair MD as PCP - General (Internal Medicine)  Daysi Nair MD as PCP - [facility]  Ya Sánchez MD (Cardiology)    Plan:    Diagnoses and all orders for this visit:    Medicare annual wellness visit, subsequent  . Age appropriate Health screening and immunization discussed with patient.      ACP (advance care planning)  -     REFERRAL TO ACP CLINICAL SPECIALIST    Need for vaccination for Strep pneumoniae  -     PNEUMOCOCCAL POLYSACCHARIDE VACCINE, 23-VALENT, ADULT OR IMMUNOSUPPRESSED PT DOSE,        Health Maintenance   Topic Date Due    DTaP/Tdap/Td series (1 - Tdap) Never done    Shingrix Vaccine Age 50> (1 of 2) Never done    Pneumococcal 65+ years (1 of 1 - PPSV23) Never done    Flu Vaccine (1) 2021    Medicare Yearly Exam  2021    Hepatitis C Screening  Completed    COVID-19 Vaccine  Completed          Urinary/ Fecal Incontinence: Yes urinary frequency    Regular physical exercise: Yes walking daily and will use stationary bike    Patient verbalized understanding of information presented. AVS and Medicare Part B Preventive Services Table printed and given to pt and reviewed. See table for findings under Recommendation and Scheduled. All of the patient's questions were answered. Mao Casarez (: 1945) is a 67 y.o. male, established patient, here for evaluation of the following chief complaint(s): Annual Wellness Visit (Urologist put on mediation- pt reports stopping due to \"heart flutters\"- fell and will need a referral to back specialist, reoccuring knee pain)     Written by Dee Goldberg, as dictated by Dr. Cherelle Denis MD.      ASSESSMENT/PLAN:  Below is the assessment and plan developed based on review of pertinent history, physical exam, labs, studies, and medications. 1. Palpitations  Completed an EKG in the office today and results were unremarkable. Prescribed amlodipine 5 mg, take 1 tab daily as prescribed. Potential side effects were discussed. Make an appointment with Dr. Mayda Heredia, Cardiology. -     AMB POC EKG ROUTINE W/ 12 LEADS, INTER & REP  -     amLODIPine (NORVASC) 5 mg tablet; Take 1 Tablet by mouth daily for 90 days. , Normal, Disp-90 Tablet, R-0 sent to pharmacy. 2. Essential hypertension  Prescribed amlodipine 5 mg, take 1 tab daily as prescribed. Potential side effects were discussed. Make an appointment with Dr. Mayda Heredia, Cardiology. -     amLODIPine (NORVASC) 5 mg tablet; Take 1 Tablet by mouth daily for 90 days. , Normal, Disp-90 Tablet, R-0 sent to pharmacy.     3. Benign prostatic hyperplasia (BPH) with urinary urgency  Continue taking Hytrin 2 mg as prescribed. Followed by Urology. Discuss side effects from solifenacin 5 mg with Urology. 4. Chronic midline low back pain without sciatica  Referred to Pain Management.   -     REFERRAL TO PAIN MANAGEMENT    SUBJECTIVE/OBJECTIVE:  HPI    Patient presents today for annual wellness and follow up on his chronic conditions . His  BP today is 160/77, 155/86 manual repeat. He saw Dr. Luan Jones, Cardiology, last year and     He takes Hytrin 2 mg for benign prostatic hyperplasia. At his recent visit with the Urologist he was prescribed solifenacin 5 mg. He notes the solifenacin 5 mg gave him heart palpitations so he d/c the medication himself. He has a hx of back pain and is currently completing PT 2x/week. He notes the PT does not help subside his pain. He takes Prilosec 20 mg for GERD. He takes Lofibra 160 mg for hyperlipidemia. Patient Active Problem List   Diagnosis Code    Benign prostatic hyperplasia without lower urinary tract symptoms N40.0    Arthritis M19.90    Gastroesophageal reflux disease without esophagitis K21.9    Mixed hyperlipidemia E78.2        Current Outpatient Medications on File Prior to Visit   Medication Sig Dispense Refill    terazosin (HYTRIN) 2 mg capsule TAKE 1 CAPSULE BY MOUTH EVERY DAY AT NIGHT 90 Capsule 0    omeprazole (PRILOSEC) 20 mg capsule TAKE 1 CAPSULE BY MOUTH EVERY DAY 90 Capsule 0    fenofibrate (LOFIBRA) 160 mg tablet TAKE 1 TABLET BY MOUTH EVERY DAY 90 Tablet 0    aspirin 81 mg chewable tablet Take 1 Tablet by mouth daily. Take 81 mg by mouth daily. 90 Tablet 0     No current facility-administered medications on file prior to visit. No Known Allergies    Past Medical History:   Diagnosis Date    Arthritis     BPH (benign prostatic hyperplasia)     Hyperlipidemia        History reviewed. No pertinent surgical history. History reviewed. No pertinent family history.     Social History     Socioeconomic History    Marital status:  Spouse name: Not on file    Number of children: Not on file    Years of education: Not on file    Highest education level: Not on file   Occupational History    Not on file   Tobacco Use    Smoking status: Never Smoker    Smokeless tobacco: Never Used   Vaping Use    Vaping Use: Never used   Substance and Sexual Activity    Alcohol use: Not Currently    Drug use: Never    Sexual activity: Not Currently   Other Topics Concern    Not on file   Social History Narrative    Not on file     Social Determinants of Health     Financial Resource Strain:     Difficulty of Paying Living Expenses:    Food Insecurity:     Worried About Running Out of Food in the Last Year:     [address] in the Last Year:    Transportation Needs:     Lack of Transportation (Medical):      Lack of Transportation (Non-Medical):    Physical Activity:     Days of Exercise per Week:     Minutes of Exercise per Session:    Stress:     Feeling of Stress :    Social Connections:     Frequency of Communication with Friends and Family:     Frequency of Social Gatherings with Friends and Family:     Attends Rastafarian Services:     Active Member of Clubs or Organizations:     Attends Club or Organization Meetings:     Marital Status:    Intimate Partner Violence:     Fear of Current or Ex-Partner:     Emotionally Abused:     Physically Abused:     Sexually Abused:        Orders Only on 07/13/2021   Component Date Value Ref Range Status    Color 07/13/2021 YELLOW/STRAW    Final    Color Reference Range: Straw, Yellow or Dark Yellow    Appearance 07/13/2021 CLEAR  CLEAR   Final    Specific gravity 07/13/2021 1.017  1.003 - 1.030   Final    pH (UA) 07/13/2021 8.0  5.0 - 8.0   Final    Protein 07/13/2021 30* Negative mg/dL Final    Glucose 07/13/2021 Negative  Negative mg/dL Final    Ketone 07/13/2021 Negative  Negative mg/dL Final    Bilirubin 07/13/2021 Negative  Negative   Final    Blood 07/13/2021 Negative Negative   Final    Urobilinogen 07/13/2021 0.2  0.2 - 1.0 EU/dL Final    Nitrites 07/13/2021 Negative  Negative   Final    Leukocyte Esterase 07/13/2021 Negative  Negative   Final    WBC 07/13/2021 0-4  0 - 4 /hpf Final    RBC 07/13/2021 0-5  0 - 5 /hpf Final    Epithelial cells 07/13/2021 FEW  FEW /lpf Final    Comment: Epithelial cell category consists of squamous cells and /or transitional  urothelial cells. Renal tubular cells, if present, are separately identified as  such.  Bacteria 07/13/2021 1+* Negative /hpf Final    Cholesterol, total 07/13/2021 190  <200 MG/DL Final    Triglyceride 07/13/2021 96  <150 MG/DL Final    Comment: Based on NCEP-ATP III:  Triglycerides <150 mg/dL  is considered normal, 150-199  mg/dL  borderline high,  200-499 mg/dL high and  greater than or equal to 500  mg/dL very high.  HDL Cholesterol 07/13/2021 61  MG/DL Final    Comment: Based on NCEP ATP III, HDL Cholesterol <40 mg/dL is considered low and >60  mg/dL is elevated.       LDL, calculated 07/13/2021 109.8* 0 - 100 MG/DL Final    Comment: Based on the NCEP-ATP: LDL-C concentrations are considered  optimal <100 mg/dL,  near optimal/above Normal 100-129 mg/dL Borderline High: 130-159, High: 160-189  mg/dL Very High: Greater than or equal to 190 mg/dL      VLDL, calculated 07/13/2021 19.2  MG/DL Final    CHOL/HDL Ratio 07/13/2021 3.1  0.0 - 5.0   Final    WBC 07/13/2021 4.9  4.1 - 11.1 K/uL Final    RBC 07/13/2021 4.67  4.10 - 5.70 M/uL Final    HGB 07/13/2021 14.8  12.1 - 17.0 g/dL Final    HCT 07/13/2021 44.1  36.6 - 50.3 % Final    MCV 07/13/2021 94.4  80.0 - 99.0 FL Final    MCH 07/13/2021 31.7  26.0 - 34.0 PG Final    MCHC 07/13/2021 33.6  30.0 - 36.5 g/dL Final    RDW 07/13/2021 12.3  11.5 - 14.5 % Final    PLATELET 57/95/0967 134  150 - 400 K/uL Final    MPV 07/13/2021 10.5  8.9 - 12.9 FL Final    NRBC 07/13/2021 0.0  0  WBC Final    ABSOLUTE NRBC 07/13/2021 0.00  0.00 - 0.01 K/uL Final    Sodium 07/13/2021 142  136 - 145 mmol/L Final    Potassium 07/13/2021 4.6  3.5 - 5.1 mmol/L Final    Chloride 07/13/2021 110* 97 - 108 mmol/L Final    CO2 07/13/2021 29  21 - 32 mmol/L Final    Anion gap 07/13/2021 3* 5 - 15 mmol/L Final    Glucose 07/13/2021 92  65 - 100 mg/dL Final    BUN 07/13/2021 16  6 - 20 MG/DL Final    Creatinine 07/13/2021 1.08  0.70 - 1.30 MG/DL Final    BUN/Creatinine ratio 07/13/2021 15  12 - 20   Final    GFR est AA 07/13/2021 >60  >60 ml/min/1.73m2 Final    GFR est non-AA 07/13/2021 >60  >60 ml/min/1.73m2 Final    Comment: Estimated GFR is calculated using the IDMS-traceable Modification of Diet in  Renal Disease (MDRD) Study equation, reported for both  Americans  (GFRAA) and non- Americans (GFRNA), and normalized to 1.73m2 body  surface area. The physician must decide which value applies to the patient.  Calcium 07/13/2021 9.4  8.5 - 10.1 MG/DL Final    Bilirubin, total 07/13/2021 0.6  0.2 - 1.0 MG/DL Final    ALT (SGPT) 07/13/2021 26  12 - 78 U/L Final    AST (SGOT) 07/13/2021 20  15 - 37 U/L Final    Alk. phosphatase 07/13/2021 48  45 - 117 U/L Final    Protein, total 07/13/2021 7.4  6.4 - 8.2 g/dL Final    Albumin 07/13/2021 4.4  3.5 - 5.0 g/dL Final    Globulin 07/13/2021 3.0  2.0 - 4.0 g/dL Final    A-G Ratio 07/13/2021 1.5  1.1 - 2.2   Final    Hep C virus Ab Interp. 07/13/2021 NONREACTIVE  NONREACTIVE   Final    Hep C  virus Ab comment 07/13/2021 Method used is [employer]    Final       Review of Systems   Constitutional: Negative for activity change, fatigue and unexpected weight change. HENT: Negative for congestion, ear discharge, ear pain, hearing loss, rhinorrhea and sore throat. Eyes: Negative for pain, discharge and redness. Respiratory: Negative for cough, chest tightness and shortness of breath. Cardiovascular: Positive for palpitations. Negative for leg swelling.    Gastrointestinal: Negative for abdominal pain, constipation and diarrhea. Endocrine: Negative for polyuria. Genitourinary: Negative for dysuria, flank pain and urgency. Musculoskeletal: Positive for back pain. Negative for arthralgias and myalgias. Skin: Negative for color change. Neurological: Negative for dizziness, light-headedness and headaches. Psychiatric/Behavioral: Negative for dysphoric mood and sleep disturbance. The patient is not nervous/anxious. Visit Vitals  BP (!) 155/86 (BP 1 Location: Left arm)   Pulse (!) 50   Temp 97.7 °F (36.5 °C)   Resp 16   Ht 5' 10\" (1.778 m)   Wt 180 lb 9.6 oz (81.9 kg)   SpO2 98%   BMI 25.91 kg/m²       Physical Exam  Vitals and nursing note reviewed. Constitutional:       General: He is not in acute distress. Appearance: Normal appearance. He is well-developed. He is not diaphoretic. HENT:      Head: Normocephalic and atraumatic. Right Ear: Tympanic membrane, ear canal and external ear normal.      Left Ear: Tympanic membrane, ear canal and external ear normal.      Mouth/Throat:      Mouth: Mucous membranes are moist.      Pharynx: Oropharynx is clear. Eyes:      General: No scleral icterus. Right eye: No discharge. Left eye: No discharge. Extraocular Movements: Extraocular movements intact. Conjunctiva/sclera: Conjunctivae normal.   Neck:      Thyroid: No thyromegaly. Cardiovascular:      Rate and Rhythm: Normal rate and regular rhythm. Pulses: Normal pulses. Dorsalis pedis pulses are 2+ on the right side and 2+ on the left side. Pulmonary:      Effort: Pulmonary effort is normal.      Breath sounds: Normal breath sounds. No wheezing. Abdominal:      General: Bowel sounds are normal. There is no distension. Palpations: Abdomen is soft. Tenderness: There is no abdominal tenderness. Musculoskeletal:      Cervical back: Normal range of motion and neck supple. Right lower leg: No edema.       Left lower leg: No edema. Comments: B/L knees without crepitus   Lymphadenopathy:      Cervical: No cervical adenopathy. Neurological:      Deep Tendon Reflexes:      Reflex Scores:       Patellar reflexes are 2+ on the right side and 2+ on the left side. An electronic signature was used to authenticate this note.   -- Selina Page

## 2021-09-28 ENCOUNTER — DOCUMENTATION ONLY (OUTPATIENT)
Dept: FAMILY MEDICINE CLINIC | Facility: CLINIC | Age: 76
End: 2021-09-28

## 2021-09-28 NOTE — ACP (ADVANCE CARE PLANNING)
Advance Care Planning   Ambulatory ACP Specialist Patient Outreach    Date:  9/28/2021    ACP Specialist:  Brennan Camarillo RN    Outreach call to patient in follow-up to ACP Specialist referral from:    [x] PCP  [] Provider   [] Ambulatory Care Management [] Other     For:                  [x] Advance Directive Assistance              [] Complete Portable DNR order              [] Complete POST/MOST              [] Code Status Discussion             [] Discuss Goals of Care             [] Early ACP Decision-Making              [] Other (Specify)    Date Referral Received: 9/28/21    Today's Outreach:  [x] First   [] Second  [] Third       Third outreach made by: [] Phone  [] Email / mail    [] Digifyt     Intervention:  [x] Spoke with Patient and daughter Zelda Bergeron   [] Left VM requesting return call      Outcome: Patient put his daughter, Zelda Bergeron, on speaker phone to help him understand the purpose for my calling. Daughter confused as to why ACP Specialist is contacting the patient, stating she feels his PCP office or hospital should complete the AMD, and \"this is not something to be completed over the phone\". Attempted to explain my reason for calling in multiple ways. Advised pt and his daughter to call PCP office for further clarification. Next Step:   [] ACP scheduled conversation  [x] Outreach again in one week               [] Email / Mail ACP Info Sheets  [] Email / Mail Advance Directive   [] Closing referral.  Routing closure to referring provider/staff and to ACP Specialist . [] Closure letter mailed to patient with invitation to contact ACP Specialist if / when ready.   Thank you for this referral.

## 2021-10-02 NOTE — PROGRESS NOTES
[facility]: [place]  [phone]    History of Present Illness:  Mr. Sadiq Guerra is a 68 yo M with a history of dyslipidemia, very remote tobacco use, quit 40 years ago. On his last visit due to palpitations, his heart monitor showed his heart would have frequent premature atrial contractions and atrial tachycardia at times, but also bradycardia at times, had him see Dr. Jonna Goff. They had talked to him possibly about pacemaker or ablation of the atrial tachycardia. They decided they did not want anything done at the time and tried to hold off. He is here now because he is having more frequent palpitations. He denies any exertional chest pain. His breathing has been stable. He did have an echocardiogram and stress test in 11/2020 that were both normal.  He is compensated on exam with clear lungs and no lower extremity edema. Soc hx. No tobacco. Of note, he has been also dealing with his wife passed away last January. He does have a daughter who lives nearby though. Fam hx. No early CAD  Assessment and Plan:   1. Atrial tachycardia, frequent PACs, palpitations. His loop monitor last year did show episodes of atrial tachycardia consistent with his palpitations. They are more frequent now and will go ahead and have him try Toprol 25 mg once a day to replace his Amlodipine. He did see Dr. Jonna Goff in the past for possible ablation or pacemaker if needed that he might need to control his rate. Will have him follow back in a month. Depending on his symptoms and how he responds to medication, he may end up needing a pacemaker, but wwill assess this. 2. Dyslipidemia. 3. Remote tobacco use. He  has a past medical history of Arthritis, BPH (benign prostatic hyperplasia), and Hyperlipidemia. All other systems negative except as above.      PE  Vitals:    10/05/21 0949   BP: 120/80   Pulse: (!) 58   Resp: 16   SpO2: 98%   Weight: 179 lb (81.2 kg)   Height: 5' 10\" (1.778 m)    Body mass index is 25.68 kg/m². General appearance - alert, well appearing, and in no distress  Mental status - affect appropriate to mood  Eyes - sclera anicteric, moist mucous membranes  Neck - supple, no JVD  Chest - clear to auscultation, no wheezes, rales or rhonchi  Heart - normal rate, regular rhythm, normal S1, S2, no murmurs, rubs, clicks or gallops  Abdomen - soft, nontender, nondistended, no masses or organomegaly  Neurological - no focal deficit  Extremities - peripheral pulses normal, no pedal edema      Recent Labs:  Lab Results   Component Value Date/Time    Cholesterol, total 190 07/13/2021 03:51 PM    HDL Cholesterol 61 07/13/2021 03:51 PM    LDL, calculated 109.8 (H) 07/13/2021 03:51 PM    Triglyceride 96 07/13/2021 03:51 PM    CHOL/HDL Ratio 3.1 07/13/2021 03:51 PM     Lab Results   Component Value Date/Time    Creatinine 1.08 07/13/2021 03:51 PM     Lab Results   Component Value Date/Time    BUN 16 07/13/2021 03:51 PM     Lab Results   Component Value Date/Time    Potassium 4.6 07/13/2021 03:51 PM     Lab Results   Component Value Date/Time    Hemoglobin A1c 5.4 09/23/2020 02:42 PM     Lab Results   Component Value Date/Time    HGB 14.8 07/13/2021 03:51 PM     Lab Results   Component Value Date/Time    PLATELET 055 69/31/7356 03:51 PM       Reviewed:  Past Medical History:   Diagnosis Date    Arthritis     BPH (benign prostatic hyperplasia)     Hyperlipidemia      Social History     Tobacco Use   Smoking Status Never Smoker   Smokeless Tobacco Never Used     Social History     Substance and Sexual Activity   Alcohol Use Not Currently     No Known Allergies    Current Outpatient Medications   Medication Sig    diclofenac (VOLTAREN) 1 % gel Apply 4 g to affected area.  amLODIPine (NORVASC) 5 mg tablet Take 1 Tablet by mouth daily for 90 days.     terazosin (HYTRIN) 2 mg capsule TAKE 1 CAPSULE BY MOUTH EVERY DAY AT NIGHT    omeprazole (PRILOSEC) 20 mg capsule TAKE 1 CAPSULE BY MOUTH EVERY DAY    fenofibrate (LOFIBRA) 160 mg tablet TAKE 1 TABLET BY MOUTH EVERY DAY    aspirin 81 mg chewable tablet Take 1 Tablet by mouth daily. Take 81 mg by mouth daily. No current facility-administered medications for this visit.        Manav Mcclure MD  [facility] heart and Vascular [place]  [address] [facility] [address]  [facility], [address]

## 2021-10-05 ENCOUNTER — OFFICE VISIT (OUTPATIENT)
Dept: CARDIOLOGY CLINIC | Age: 76
End: 2021-10-05
Payer: MEDICARE

## 2021-10-05 ENCOUNTER — DOCUMENTATION ONLY (OUTPATIENT)
Dept: FAMILY MEDICINE CLINIC | Facility: CLINIC | Age: 76
End: 2021-10-05

## 2021-10-05 VITALS
HEART RATE: 58 BPM | BODY MASS INDEX: 25.62 KG/M2 | HEIGHT: 70 IN | DIASTOLIC BLOOD PRESSURE: 80 MMHG | OXYGEN SATURATION: 98 % | WEIGHT: 179 LBS | SYSTOLIC BLOOD PRESSURE: 120 MMHG | RESPIRATION RATE: 16 BRPM

## 2021-10-05 DIAGNOSIS — Z87.891 HISTORY OF TOBACCO USE: ICD-10-CM

## 2021-10-05 DIAGNOSIS — I49.1 PAC (PREMATURE ATRIAL CONTRACTION): Primary | ICD-10-CM

## 2021-10-05 DIAGNOSIS — I47.1 PAT (PAROXYSMAL ATRIAL TACHYCARDIA) (HCC): ICD-10-CM

## 2021-10-05 DIAGNOSIS — R00.2 HEART PALPITATIONS: ICD-10-CM

## 2021-10-05 DIAGNOSIS — E78.5 DYSLIPIDEMIA: ICD-10-CM

## 2021-10-05 DIAGNOSIS — I44.1 SECOND DEGREE AV BLOCK, MOBITZ TYPE I: ICD-10-CM

## 2021-10-05 PROCEDURE — G8536 NO DOC ELDER MAL SCRN: HCPCS | Performed by: INTERNAL MEDICINE

## 2021-10-05 PROCEDURE — 1101F PT FALLS ASSESS-DOCD LE1/YR: CPT | Performed by: INTERNAL MEDICINE

## 2021-10-05 PROCEDURE — 99214 OFFICE O/P EST MOD 30 MIN: CPT | Performed by: INTERNAL MEDICINE

## 2021-10-05 PROCEDURE — G0463 HOSPITAL OUTPT CLINIC VISIT: HCPCS | Performed by: INTERNAL MEDICINE

## 2021-10-05 PROCEDURE — G8419 CALC BMI OUT NRM PARAM NOF/U: HCPCS | Performed by: INTERNAL MEDICINE

## 2021-10-05 PROCEDURE — G8510 SCR DEP NEG, NO PLAN REQD: HCPCS | Performed by: INTERNAL MEDICINE

## 2021-10-05 PROCEDURE — G8427 DOCREV CUR MEDS BY ELIG CLIN: HCPCS | Performed by: INTERNAL MEDICINE

## 2021-10-05 RX ORDER — METOPROLOL SUCCINATE 25 MG/1
25 TABLET, EXTENDED RELEASE ORAL DAILY
Qty: 30 TABLET | Refills: 5 | Status: SHIPPED | OUTPATIENT
Start: 2021-10-05 | End: 2022-02-22

## 2021-10-05 RX ORDER — DICLOFENAC SODIUM 10 MG/G
4 GEL TOPICAL
COMMUNITY
Start: 2021-05-10 | End: 2022-07-01

## 2021-10-05 NOTE — ACP (ADVANCE CARE PLANNING)
Advance Care Planning   Ambulatory ACP Specialist Patient Outreach    Date:  10/5/2021    ACP Specialist:  Jose Mcintosh RN    Outreach call to patient in follow-up to ACP Specialist referral from:    [x] PCP  [] Provider   [] Ambulatory Care Management [] Other     For:                  [x] Advance Directive Assistance              [] Complete Portable DNR order              [] Complete POST/MOST              [] Code Status Discussion             [] Discuss Goals of Care             [] Early ACP Decision-Making              [] Other (Specify)    Date Referral Received: 9/28/21    Today's Outreach:  [] First   [x] Second  [] Third       Third outreach made by: [] Phone  [] Email / mail    [] Fippext     Intervention:  [x] Spoke with Patient patient's daughter Vanna Park  [] Left VM requesting return call      Outcome: Unable to reach patient by phone. Patient's daughter Vanna Park reports they have not called her father's PCP yet, and declines ACP services at this time. Advised he notify his PCP if he decides to proceed with completion of an AMD.      Next Step:   [] ACP scheduled conversation  [] Outreach again in one week               [] Email / Mail [address]  [] Email / Mail Advance Directive   [x] Closing referral.  Routing closure to referring provider/staff and to ACP Specialist . [] Closure letter mailed to patient with invitation to contact ACP Specialist if / when ready.   Thank you for this referral.

## 2021-10-06 DIAGNOSIS — K21.9 GASTROESOPHAGEAL REFLUX DISEASE WITHOUT ESOPHAGITIS: ICD-10-CM

## 2021-10-06 RX ORDER — OMEPRAZOLE 20 MG/1
CAPSULE, DELAYED RELEASE ORAL
Qty: 90 CAPSULE | Refills: 0 | Status: SHIPPED | OUTPATIENT
Start: 2021-10-06 | End: 2021-12-14

## 2021-10-06 RX ORDER — GUAIFENESIN 100 MG/5ML
LIQUID (ML) ORAL
Qty: 90 TABLET | Refills: 0 | Status: SHIPPED | OUTPATIENT
Start: 2021-10-06 | End: 2022-02-22

## 2021-10-28 DIAGNOSIS — N40.0 BENIGN PROSTATIC HYPERPLASIA WITHOUT LOWER URINARY TRACT SYMPTOMS: ICD-10-CM

## 2021-10-28 RX ORDER — TERAZOSIN 2 MG/1
CAPSULE ORAL
Qty: 90 CAPSULE | Refills: 0 | Status: SHIPPED | OUTPATIENT
Start: 2021-10-28 | End: 2022-09-28 | Stop reason: ALTCHOICE

## 2021-11-03 ENCOUNTER — TRANSCRIBE ORDER (OUTPATIENT)
Dept: SCHEDULING | Age: 76
End: 2021-11-03

## 2021-11-03 DIAGNOSIS — M54.16 LEFT LUMBAR RADICULITIS: Primary | ICD-10-CM

## 2021-11-03 DIAGNOSIS — M47.816 LUMBAR SPONDYLOSIS: ICD-10-CM

## 2021-11-03 DIAGNOSIS — E78.2 MIXED HYPERLIPIDEMIA: ICD-10-CM

## 2021-11-03 RX ORDER — FENOFIBRATE 160 MG/1
TABLET ORAL
Qty: 90 TABLET | Refills: 0 | Status: SHIPPED | OUTPATIENT
Start: 2021-11-03 | End: 2021-12-14

## 2021-11-09 ENCOUNTER — OFFICE VISIT (OUTPATIENT)
Dept: CARDIOLOGY CLINIC | Age: 76
End: 2021-11-09
Payer: MEDICARE

## 2021-11-09 VITALS
DIASTOLIC BLOOD PRESSURE: 60 MMHG | HEIGHT: 70 IN | HEART RATE: 62 BPM | BODY MASS INDEX: 26.37 KG/M2 | WEIGHT: 184.2 LBS | RESPIRATION RATE: 16 BRPM | OXYGEN SATURATION: 99 % | SYSTOLIC BLOOD PRESSURE: 120 MMHG

## 2021-11-09 DIAGNOSIS — I49.1 PAC (PREMATURE ATRIAL CONTRACTION): ICD-10-CM

## 2021-11-09 DIAGNOSIS — E78.5 DYSLIPIDEMIA: ICD-10-CM

## 2021-11-09 DIAGNOSIS — I20.0 UNSTABLE ANGINA (HCC): Primary | ICD-10-CM

## 2021-11-09 DIAGNOSIS — Z87.891 HISTORY OF TOBACCO USE: ICD-10-CM

## 2021-11-09 DIAGNOSIS — R00.2 HEART PALPITATIONS: ICD-10-CM

## 2021-11-09 PROCEDURE — G8432 DEP SCR NOT DOC, RNG: HCPCS | Performed by: INTERNAL MEDICINE

## 2021-11-09 PROCEDURE — G0463 HOSPITAL OUTPT CLINIC VISIT: HCPCS | Performed by: INTERNAL MEDICINE

## 2021-11-09 PROCEDURE — 99214 OFFICE O/P EST MOD 30 MIN: CPT | Performed by: INTERNAL MEDICINE

## 2021-11-09 PROCEDURE — G8427 DOCREV CUR MEDS BY ELIG CLIN: HCPCS | Performed by: INTERNAL MEDICINE

## 2021-11-09 PROCEDURE — G8419 CALC BMI OUT NRM PARAM NOF/U: HCPCS | Performed by: INTERNAL MEDICINE

## 2021-11-09 PROCEDURE — 1101F PT FALLS ASSESS-DOCD LE1/YR: CPT | Performed by: INTERNAL MEDICINE

## 2021-11-09 PROCEDURE — G8536 NO DOC ELDER MAL SCRN: HCPCS | Performed by: INTERNAL MEDICINE

## 2021-11-09 RX ORDER — TAMSULOSIN HYDROCHLORIDE 0.4 MG/1
0.4 CAPSULE ORAL DAILY
COMMUNITY
End: 2022-09-28 | Stop reason: SDUPTHER

## 2021-11-09 NOTE — PROGRESS NOTES
[address]: [facility]  [phone]    History of Present Illness:  Mr. Agustin Hendrickson is a 68 yo M with a history of dyslipidemia, very remote tobacco use, quit 40 years ago. Used a [email or web address]  [id number]. On his last visit, he was having frequent PACs and palpitations and had him see in the past Dr. Natali Moncada for possible ablation or pacemaker, but he had deferred that. He wanted to see how he did on medication first and had him take Toprol 25 mg once daily to replace his Amlodipine. He does say that this helped with his palpitations and they are much less frequent. He still does get exertional shortness of breath, but no chest pain. His echocardiogram and stress test a year ago were normal.  He is compensated on exam with clear lungs and no lower extremity edema. His blood pressure is 120/60. Assessment and Plan:   1. Unstable angina. His shortness of breath has been progressive and concerning for possible anginal equivalent and will obtain a stress test for further evaluation. He cannot fully complete a treadmill and will do this Lexiscan. If this is unrevealing, would consider follow up with primary care physician and possible pulmonary evaluation. 2. Atrial tachycardia, frequent PACs, palpitations. He did have episodes of atrial tachycardia in the past and had him see our EP, Dr. Natali Moncada for possible ablation or pacemaker, but the patient deferred this. His symptoms of palpitations have been relatively well controlled on Toprol and will continue this. If this becomes an issue in the future, would have him reassess with Dr. Natali Moncada. 3. Dyslipidemia. 4. Remote tobacco use. He  has a past medical history of Arthritis, BPH (benign prostatic hyperplasia), and Hyperlipidemia. All other systems negative except as above.      PE  Vitals:    11/09/21 1102   BP: 120/60   Pulse: 62   Resp: 16   SpO2: 99%   Weight: 184 lb 3.2 oz (83.6 kg)   Height: 5' 10\" (1.778 m)    Body mass index is 26.43 kg/m². General appearance - alert, well appearing, and in no distress  Mental status - affect appropriate to mood  Eyes - sclera anicteric, moist mucous membranes  Neck - supple, no JVD  Chest - clear to auscultation, no wheezes, rales or rhonchi  Heart - normal rate, regular rhythm, normal S1, S2, no murmurs, rubs, clicks or gallops  Abdomen - soft, nontender, nondistended, no masses or organomegaly  Neurological - no focal deficit  Extremities - peripheral pulses normal, no pedal edema      Recent Labs:  Lab Results   Component Value Date/Time    Cholesterol, total 190 07/13/2021 03:51 PM    HDL Cholesterol 61 07/13/2021 03:51 PM    LDL, calculated 109.8 (H) 07/13/2021 03:51 PM    Triglyceride 96 07/13/2021 03:51 PM    CHOL/HDL Ratio 3.1 07/13/2021 03:51 PM     Lab Results   Component Value Date/Time    Creatinine 1.08 07/13/2021 03:51 PM     Lab Results   Component Value Date/Time    BUN 16 07/13/2021 03:51 PM     Lab Results   Component Value Date/Time    Potassium 4.6 07/13/2021 03:51 PM     Lab Results   Component Value Date/Time    Hemoglobin A1c 5.4 09/23/2020 02:42 PM     Lab Results   Component Value Date/Time    HGB 14.8 07/13/2021 03:51 PM     Lab Results   Component Value Date/Time    PLATELET 097 76/84/6529 03:51 PM       Reviewed:  Past Medical History:   Diagnosis Date    Arthritis     BPH (benign prostatic hyperplasia)     Hyperlipidemia      Social History     Tobacco Use   Smoking Status Never Smoker   Smokeless Tobacco Never Used     Social History     Substance and Sexual Activity   Alcohol Use Not Currently     Not on File    Current Outpatient Medications   Medication Sig    tamsulosin (FLOMAX) 0.4 mg capsule Take 0.4 mg by mouth daily.     fenofibrate (LOFIBRA) 160 mg tablet TAKE 1 TABLET BY MOUTH EVERY DAY    terazosin (HYTRIN) 2 mg capsule TAKE 1 CAPSULE BY MOUTH EVERY NIGHT    aspirin 81 mg chewable tablet TAKE 1 TABLET BY MOUTH EVERY DAY    omeprazole (PRILOSEC) 20 mg capsule TAKE 1 CAPSULE BY MOUTH EVERY DAY    diclofenac (VOLTAREN) 1 % gel Apply 4 g to affected area.  metoprolol succinate (TOPROL-XL) 25 mg XL tablet Take 1 Tablet by mouth daily. No current facility-administered medications for this visit.        Beny Blackwell MD  [facility]  [address] [facility] [address]  [facility], [address]

## 2021-11-24 ENCOUNTER — ANCILLARY PROCEDURE (OUTPATIENT)
Dept: CARDIOLOGY CLINIC | Age: 76
End: 2021-11-24
Payer: MEDICARE

## 2021-11-24 VITALS
BODY MASS INDEX: 26.34 KG/M2 | WEIGHT: 184 LBS | HEIGHT: 70 IN | DIASTOLIC BLOOD PRESSURE: 70 MMHG | SYSTOLIC BLOOD PRESSURE: 122 MMHG

## 2021-11-24 DIAGNOSIS — I20.0 UNSTABLE ANGINA (HCC): ICD-10-CM

## 2021-11-24 DIAGNOSIS — E78.5 DYSLIPIDEMIA: ICD-10-CM

## 2021-11-24 DIAGNOSIS — I49.1 PAC (PREMATURE ATRIAL CONTRACTION): ICD-10-CM

## 2021-11-24 DIAGNOSIS — R00.2 HEART PALPITATIONS: ICD-10-CM

## 2021-11-24 DIAGNOSIS — Z87.891 HISTORY OF TOBACCO USE: ICD-10-CM

## 2021-11-24 LAB
STRESS BASELINE DIAS BP: 70 MMHG
STRESS BASELINE HR: 62 BPM
STRESS BASELINE SYS BP: 122 MMHG
STRESS O2 SAT PEAK: 100 %
STRESS O2 SAT REST: 98 %
STRESS PEAK DIAS BP: 70 MMHG
STRESS PEAK SYS BP: 130 MMHG
STRESS PERCENT HR ACHIEVED: 88 %
STRESS POST PEAK HR: 126 BPM
STRESS RATE PRESSURE PRODUCT: NORMAL BPM*MMHG
STRESS TARGET HR: 143 BPM

## 2021-11-24 PROCEDURE — 78452 HT MUSCLE IMAGE SPECT MULT: CPT | Performed by: INTERNAL MEDICINE

## 2021-11-24 PROCEDURE — 93016 CV STRESS TEST SUPVJ ONLY: CPT | Performed by: INTERNAL MEDICINE

## 2021-11-24 PROCEDURE — A9500 TC99M SESTAMIBI: HCPCS | Performed by: INTERNAL MEDICINE

## 2021-11-24 PROCEDURE — 93017 CV STRESS TEST TRACING ONLY: CPT | Performed by: INTERNAL MEDICINE

## 2021-11-24 PROCEDURE — 93018 CV STRESS TEST I&R ONLY: CPT | Performed by: INTERNAL MEDICINE

## 2021-11-24 RX ORDER — TETRAKIS(2-METHOXYISOBUTYLISOCYANIDE)COPPER(I) TETRAFLUOROBORATE 1 MG/ML
30 INJECTION, POWDER, LYOPHILIZED, FOR SOLUTION INTRAVENOUS ONCE
Status: COMPLETED | OUTPATIENT
Start: 2021-11-24 | End: 2021-11-24

## 2021-11-24 RX ORDER — TETRAKIS(2-METHOXYISOBUTYLISOCYANIDE)COPPER(I) TETRAFLUOROBORATE 1 MG/ML
10 INJECTION, POWDER, LYOPHILIZED, FOR SOLUTION INTRAVENOUS ONCE
Status: COMPLETED | OUTPATIENT
Start: 2021-11-24 | End: 2021-11-24

## 2021-11-24 RX ADMIN — REGADENOSON 0.4 MG: 0.08 INJECTION, SOLUTION INTRAVENOUS at 11:25

## 2021-11-24 RX ADMIN — TECHNETIUM TC 99M SESTAMIBI 26.4 MILLICURIE: 1 INJECTION, POWDER, FOR SOLUTION INTRAVENOUS at 11:25

## 2021-11-24 RX ADMIN — TECHNETIUM TC 99M SESTAMIBI 7.8 MILLICURIE: 1 INJECTION, POWDER, FOR SOLUTION INTRAVENOUS at 10:40

## 2021-12-14 DIAGNOSIS — K21.9 GASTROESOPHAGEAL REFLUX DISEASE WITHOUT ESOPHAGITIS: ICD-10-CM

## 2021-12-14 DIAGNOSIS — E78.2 MIXED HYPERLIPIDEMIA: ICD-10-CM

## 2021-12-14 RX ORDER — OMEPRAZOLE 20 MG/1
CAPSULE, DELAYED RELEASE ORAL
Qty: 90 CAPSULE | Refills: 0 | Status: SHIPPED | OUTPATIENT
Start: 2021-12-14 | End: 2022-02-22

## 2021-12-14 RX ORDER — FENOFIBRATE 160 MG/1
TABLET ORAL
Qty: 90 TABLET | Refills: 0 | Status: SHIPPED | OUTPATIENT
Start: 2021-12-14 | End: 2022-02-22

## 2021-12-15 ENCOUNTER — TELEPHONE (OUTPATIENT)
Dept: CARDIOLOGY CLINIC | Age: 76
End: 2021-12-15

## 2021-12-15 NOTE — TELEPHONE ENCOUNTER
Patient called requesting test results from 11/24    Please advise        [facility]:[phone]

## 2022-01-20 NOTE — TELEPHONE ENCOUNTER
Called pharmacy- patient is receiving this medication Gabapentin 100 mg three times a day from DR Mary Inman- who is with pain management with [facility] 12-Mar-2019 01:21

## 2022-01-20 NOTE — TELEPHONE ENCOUNTER
New RX     Gabapentin 100 mg capsule  Sig : Take 1 capsule By mouth 3 times a day  Quantity: 270  90 Day Supply

## 2022-02-21 DIAGNOSIS — K21.9 GASTROESOPHAGEAL REFLUX DISEASE WITHOUT ESOPHAGITIS: ICD-10-CM

## 2022-02-21 DIAGNOSIS — E78.2 MIXED HYPERLIPIDEMIA: ICD-10-CM

## 2022-02-22 RX ORDER — GUAIFENESIN 100 MG/5ML
LIQUID (ML) ORAL
Qty: 90 TABLET | Refills: 0 | Status: SHIPPED | OUTPATIENT
Start: 2022-02-22 | End: 2022-07-22

## 2022-02-22 RX ORDER — GABAPENTIN 100 MG/1
CAPSULE ORAL
Qty: 90 CAPSULE | Refills: 0 | OUTPATIENT
Start: 2022-02-22

## 2022-02-22 RX ORDER — OMEPRAZOLE 20 MG/1
CAPSULE, DELAYED RELEASE ORAL
Qty: 90 CAPSULE | Refills: 0 | Status: SHIPPED | OUTPATIENT
Start: 2022-02-22 | End: 2022-06-12

## 2022-02-22 RX ORDER — FENOFIBRATE 160 MG/1
TABLET ORAL
Qty: 90 TABLET | Refills: 0 | Status: SHIPPED | OUTPATIENT
Start: 2022-02-22 | End: 2022-06-13

## 2022-02-22 RX ORDER — METOPROLOL SUCCINATE 25 MG/1
TABLET, EXTENDED RELEASE ORAL
Qty: 90 TABLET | Refills: 1 | Status: SHIPPED | OUTPATIENT
Start: 2022-02-22 | End: 2022-09-28 | Stop reason: SDUPTHER

## 2022-02-22 NOTE — TELEPHONE ENCOUNTER
Requested Prescriptions     Signed Prescriptions Disp Refills    metoprolol succinate (TOPROL-XL) 25 mg XL tablet 90 Tablet 1     Sig: TAKE 1 TABLET BY MOUTH EVERY DAY     Authorizing Provider: Dana Simon     Ordering User: Mercedes Loaiza       Last office visit 11/9/2021    Per Dr. Ya Elder     No future appointments.

## 2022-03-18 PROBLEM — E78.2 MIXED HYPERLIPIDEMIA: Status: ACTIVE | Noted: 2020-06-23

## 2022-03-19 PROBLEM — M19.90 ARTHRITIS: Status: ACTIVE | Noted: 2020-06-23

## 2022-03-19 PROBLEM — N40.0 BENIGN PROSTATIC HYPERPLASIA WITHOUT LOWER URINARY TRACT SYMPTOMS: Status: ACTIVE | Noted: 2020-06-23

## 2022-03-19 PROBLEM — K21.9 GASTROESOPHAGEAL REFLUX DISEASE WITHOUT ESOPHAGITIS: Status: ACTIVE | Noted: 2020-06-23

## 2022-06-12 DIAGNOSIS — K21.9 GASTROESOPHAGEAL REFLUX DISEASE WITHOUT ESOPHAGITIS: ICD-10-CM

## 2022-06-12 RX ORDER — OMEPRAZOLE 20 MG/1
CAPSULE, DELAYED RELEASE ORAL
Qty: 90 CAPSULE | Refills: 0 | Status: SHIPPED | OUTPATIENT
Start: 2022-06-12 | End: 2022-09-24

## 2022-06-13 DIAGNOSIS — E78.2 MIXED HYPERLIPIDEMIA: ICD-10-CM

## 2022-06-13 RX ORDER — FENOFIBRATE 160 MG/1
TABLET ORAL
Qty: 90 TABLET | Refills: 0 | Status: SHIPPED | OUTPATIENT
Start: 2022-06-13 | End: 2022-09-24

## 2022-06-30 DIAGNOSIS — M17.0 BILATERAL PRIMARY OSTEOARTHRITIS OF KNEE: ICD-10-CM

## 2022-07-01 RX ORDER — DICLOFENAC SODIUM 10 MG/G
GEL TOPICAL
Qty: 100 G | Refills: 1 | Status: SHIPPED | OUTPATIENT
Start: 2022-07-01 | End: 2022-09-28 | Stop reason: SDUPTHER

## 2022-07-22 DIAGNOSIS — K21.9 GASTROESOPHAGEAL REFLUX DISEASE WITHOUT ESOPHAGITIS: ICD-10-CM

## 2022-07-22 RX ORDER — GUAIFENESIN 100 MG/5ML
LIQUID (ML) ORAL
Qty: 30 TABLET | Refills: 2 | Status: SHIPPED | OUTPATIENT
Start: 2022-07-22 | End: 2022-09-28 | Stop reason: SDUPTHER

## 2022-09-24 DIAGNOSIS — K21.9 GASTROESOPHAGEAL REFLUX DISEASE WITHOUT ESOPHAGITIS: ICD-10-CM

## 2022-09-24 DIAGNOSIS — E78.2 MIXED HYPERLIPIDEMIA: ICD-10-CM

## 2022-09-24 RX ORDER — OMEPRAZOLE 20 MG/1
CAPSULE, DELAYED RELEASE ORAL
Qty: 90 CAPSULE | Refills: 0 | Status: SHIPPED | OUTPATIENT
Start: 2022-09-24 | End: 2022-09-28 | Stop reason: SDUPTHER

## 2022-09-24 RX ORDER — FENOFIBRATE 160 MG/1
TABLET ORAL
Qty: 90 TABLET | Refills: 0 | Status: SHIPPED | OUTPATIENT
Start: 2022-09-24

## 2022-09-28 ENCOUNTER — OFFICE VISIT (OUTPATIENT)
Dept: PRIMARY CARE CLINIC | Age: 77
End: 2022-09-28
Payer: MEDICARE

## 2022-09-28 VITALS
SYSTOLIC BLOOD PRESSURE: 136 MMHG | HEART RATE: 52 BPM | DIASTOLIC BLOOD PRESSURE: 78 MMHG | TEMPERATURE: 97.8 F | RESPIRATION RATE: 16 BRPM | OXYGEN SATURATION: 98 % | HEIGHT: 70 IN | BODY MASS INDEX: 25.11 KG/M2 | WEIGHT: 175.4 LBS

## 2022-09-28 DIAGNOSIS — Z71.89 ACP (ADVANCE CARE PLANNING): ICD-10-CM

## 2022-09-28 DIAGNOSIS — I47.1 PAT (PAROXYSMAL ATRIAL TACHYCARDIA) (HCC): ICD-10-CM

## 2022-09-28 DIAGNOSIS — N40.0 BENIGN PROSTATIC HYPERPLASIA WITHOUT LOWER URINARY TRACT SYMPTOMS: ICD-10-CM

## 2022-09-28 DIAGNOSIS — E78.2 MIXED HYPERLIPIDEMIA: ICD-10-CM

## 2022-09-28 DIAGNOSIS — K21.9 GASTROESOPHAGEAL REFLUX DISEASE WITHOUT ESOPHAGITIS: ICD-10-CM

## 2022-09-28 DIAGNOSIS — M51.36 DDD (DEGENERATIVE DISC DISEASE), LUMBAR: ICD-10-CM

## 2022-09-28 DIAGNOSIS — Z12.11 COLON CANCER SCREENING: ICD-10-CM

## 2022-09-28 DIAGNOSIS — Z00.00 MEDICARE ANNUAL WELLNESS VISIT, SUBSEQUENT: Primary | ICD-10-CM

## 2022-09-28 DIAGNOSIS — Z23 NEEDS FLU SHOT: ICD-10-CM

## 2022-09-28 DIAGNOSIS — M17.0 BILATERAL PRIMARY OSTEOARTHRITIS OF KNEE: ICD-10-CM

## 2022-09-28 PROBLEM — I47.19 PAT (PAROXYSMAL ATRIAL TACHYCARDIA): Status: ACTIVE | Noted: 2022-09-28

## 2022-09-28 PROCEDURE — G8427 DOCREV CUR MEDS BY ELIG CLIN: HCPCS | Performed by: INTERNAL MEDICINE

## 2022-09-28 PROCEDURE — 99214 OFFICE O/P EST MOD 30 MIN: CPT | Performed by: INTERNAL MEDICINE

## 2022-09-28 PROCEDURE — G8536 NO DOC ELDER MAL SCRN: HCPCS | Performed by: INTERNAL MEDICINE

## 2022-09-28 PROCEDURE — G8432 DEP SCR NOT DOC, RNG: HCPCS | Performed by: INTERNAL MEDICINE

## 2022-09-28 PROCEDURE — G0439 PPPS, SUBSEQ VISIT: HCPCS | Performed by: INTERNAL MEDICINE

## 2022-09-28 PROCEDURE — G8419 CALC BMI OUT NRM PARAM NOF/U: HCPCS | Performed by: INTERNAL MEDICINE

## 2022-09-28 PROCEDURE — 1123F ACP DISCUSS/DSCN MKR DOCD: CPT | Performed by: INTERNAL MEDICINE

## 2022-09-28 PROCEDURE — G0008 ADMIN INFLUENZA VIRUS VAC: HCPCS | Performed by: INTERNAL MEDICINE

## 2022-09-28 PROCEDURE — 1101F PT FALLS ASSESS-DOCD LE1/YR: CPT | Performed by: INTERNAL MEDICINE

## 2022-09-28 PROCEDURE — 90694 VACC AIIV4 NO PRSRV 0.5ML IM: CPT | Performed by: INTERNAL MEDICINE

## 2022-09-28 RX ORDER — LIDOCAINE 50 MG/G
PATCH TOPICAL
Qty: 1 EACH | Refills: 0 | Status: SHIPPED | OUTPATIENT
Start: 2022-09-28

## 2022-09-28 RX ORDER — METOPROLOL SUCCINATE 25 MG/1
25 TABLET, EXTENDED RELEASE ORAL DAILY
Qty: 90 TABLET | Refills: 1 | Status: SHIPPED | OUTPATIENT
Start: 2022-09-28

## 2022-09-28 RX ORDER — ACETAMINOPHEN 500 MG
500 TABLET ORAL 2 TIMES DAILY
Qty: 60 TABLET | Refills: 0 | Status: SHIPPED | OUTPATIENT
Start: 2022-09-28 | End: 2022-10-28

## 2022-09-28 RX ORDER — OMEPRAZOLE 20 MG/1
20 CAPSULE, DELAYED RELEASE ORAL DAILY
Qty: 90 CAPSULE | Refills: 0 | Status: SHIPPED | OUTPATIENT
Start: 2022-09-28

## 2022-09-28 RX ORDER — DICLOFENAC SODIUM 10 MG/G
2 GEL TOPICAL 4 TIMES DAILY
Qty: 100 G | Refills: 1 | Status: SHIPPED | OUTPATIENT
Start: 2022-09-28

## 2022-09-28 RX ORDER — GUAIFENESIN 100 MG/5ML
81 LIQUID (ML) ORAL DAILY
Qty: 30 TABLET | Refills: 2 | Status: SHIPPED | OUTPATIENT
Start: 2022-09-28

## 2022-09-28 RX ORDER — TAMSULOSIN HYDROCHLORIDE 0.4 MG/1
0.4 CAPSULE ORAL DAILY
Qty: 90 CAPSULE | Refills: 0 | Status: SHIPPED | OUTPATIENT
Start: 2022-09-28

## 2022-09-28 NOTE — PROGRESS NOTES
Chief Complaint   Patient presents with    Annual Wellness Visit   ID [id number]    Visit Vitals  BP (!) 150/74 (BP 1 Location: Right arm)   Pulse (!) 52   Temp 97.8 °F (36.6 °C)   Resp 16   Ht 5' 10\" (1.778 m)   Wt 175 lb 6.4 oz (79.6 kg)   SpO2 98%   BMI 25.17 kg/m²       1. Have you been to the ER, urgent care clinic since your last visit? Hospitalized since your last visit? No    2. Have you seen or consulted any other health care providers outside of the [address] [place] [address] since your last visit? Include any pap smears or colon screening. Yes Ortho Injection      3. For patients aged 39-70: Has the patient had a colonoscopy / FIT/ Cologuard? No      Amadou Vasquez is a 65 y.o. male and presents for Annual Medicare Wellness Visit. Assessment of cognitive impairment: Alert and oriented x 4x. Depression Screen:   3 most recent PHQ Screens 10/5/2021   Little interest or pleasure in doing things Not at all   Feeling down, depressed, irritable, or hopeless Not at all   Total Score PHQ 2 0       Fall Risk Assessment:    Fall Risk Assessment, last 12 mths 9/28/2022   Able to walk? Yes   Fall in past 12 months? 0   Do you feel unsteady? 0   Are you worried about falling 0   Is TUG test greater than 12 seconds? -   Is the gait abnormal? -   Number of falls in past 12 months -   Fall with injury? -       Abuse Screen:   Abuse Screening Questionnaire 9/27/2021   Do you ever feel afraid of your partner? N   Are you in a relationship with someone who physically or mentally threatens you? N   Is it safe for you to go home? Y       Activities of Daily Living:  Self-care.    Requires assistance with: no ADLs  Patient handle his/her own medications  Yes Use of pill box  Yes  Activities of Daily Living:   ADL Assessment 9/28/2022   Feeding yourself No Help Needed   Getting from bed to chair No Help Needed   Getting dressed No Help Needed   Bathing or showering No Help Needed   Walk across the room (includes cane/walker) No Help Needed   Using the telphone No Help Needed   Taking your medications No Help Needed   Preparing meals No Help Needed   Managing money (expenses/bills) No Help Needed   Moderately strenuous housework (laundry) No Help Needed   Shopping for personal items (toiletries/medicines) No Help Needed   Shopping for groceries No Help Needed   Driving No Help Needed   Climbing a flight of stairs No Help Needed   Getting to places beyond walking distances No Help Needed       Health Maintenance:  Daily Aspirin: yes  Bone Density: na    Glaucoma Screening: no  Immunizations:    Tetanus: unknown  -   Influenza: not up to date - will give today  Shingles: not up to date -due to the cost PPSV-23: up to date. PDRQR53: up to date    Cancer screening:     Colon: not up to date -cologuard ordered . Prostate:  not up to date - PSA ordered    Alcohol Risk Screen:   On any occasion during the past 3 months, have you had more than 3 drinks(female) or 4 drinks (male) containing alcohol in one? No  Do you average more than 7 drinks (female) or 14 drinks (male) per week? No  Type and amount:no    Hearing Loss:  denies any hearing loss    Vision Loss:   Wears glasses, contact lenses, or have any other visual impairment  Yes    Adult Nutrition Screen:  No risk factors noted. Advance Care Planning:   End of Life Planning: has NO advanced directive  - add't info requested. Referral to : yes  Brock Yeung ACP-Facilitator appointment Yes      Medications/Allergies: Reviewed with patient  Prior to Admission medications    Medication Sig Start Date End Date Taking?  Authorizing Provider   fenofibrate (LOFIBRA) 160 mg tablet TAKE 1 TABLET BY MOUTH EVERY DAY 9/24/22  Yes Renny Dang MD   omeprazole (PRILOSEC) 20 mg capsule TAKE 1 CAPSULE BY MOUTH EVERY DAY 9/24/22  Yes Renny Dang MD   aspirin 81 mg chewable tablet TAKE 1 TABLET BY MOUTH EVERY DAY 7/22/22  Yes Brittnee Acosta NP   diclofenac (VOLTAREN) 1 % gel APPLY 2 G TO AFFECTED AREA FOUR (4) TIMES DAILY 7/1/22  Yes Kamaljit Benton MD   metoprolol succinate (TOPROL-XL) 25 mg XL tablet TAKE 1 TABLET BY MOUTH EVERY DAY 2/22/22  Yes Rula Syed MD   tamsulosin [facility]) 0.4 mg capsule Take 0.4 mg by mouth daily. Yes Provider, Historical   terazosin (HYTRIN) 2 mg capsule TAKE 1 CAPSULE BY MOUTH EVERY NIGHT 10/28/21  Yes Kamaljit Benton MD       Not on File    Objective:  Visit Vitals  BP (!) 150/74 (BP 1 Location: Right arm)   Pulse (!) 52   Temp 97.8 °F (36.6 °C)   Resp 16   Ht 5' 10\" (1.778 m)   Wt 175 lb 6.4 oz (79.6 kg)   SpO2 98%   BMI 25.17 kg/m²    Body mass index is 25.17 kg/m². Problem List: Reviewed with patient and discussed risk factors. Patient Active Problem List   Diagnosis Code    Benign prostatic hyperplasia without lower urinary tract symptoms N40.0    Arthritis M19.90    Gastroesophageal reflux disease without esophagitis K21.9    Mixed hyperlipidemia E78.2       PSH: Reviewed with patient  History reviewed. No pertinent surgical history. SH: Reviewed with patient  Social History     Tobacco Use    Smoking status: Never    Smokeless tobacco: Never   Vaping Use    Vaping Use: Never used   Substance Use Topics    Alcohol use: Not Currently    Drug use: Never       FH: Reviewed with patient  No family history on file. Current medical providers:    Patient Care Team:  Kamaljit Benton MD as PCP - General (Internal Medicine Physician)  Kamaljit Benton MD as PCP - [facility]  [facility] Provider  Angel Cunningham MD (Cardiovascular Disease Physician)    Plan:      Diagnoses and all orders for this visit:    Medicare annual wellness visit, subsequent  . Age appropriate Health screening and immunization discussed with patient. He has never received vaccine records from previous PCP office.      Needs flu shot  -     INFLUENZA, FLUAD, (AGE 72 Y+), IM, PF, 0.5 ML    Colon cancer screening  -     COLOGUARD TEST (FECAL DNA COLORECTAL CANCER SCREENING)    ACP (advance care planning)  -     REFERRAL TO ACP CLINICAL SPECIALIST    Health Maintenance   Topic Date Due    DTaP/Tdap/Td series (1 - Tdap) Never done    Shingrix Vaccine Age 50> (1 of 2) Never done    COVID-19 Vaccine (4 - Booster for Pfizer series) 07/08/2022    Flu Vaccine (1) 08/01/2022    Pneumococcal 65+ years (2 - PCV) 09/27/2022    Medicare Yearly Exam  09/28/2022    Depression Screen  10/05/2022    Hepatitis C Screening  Completed          Urinary/ Fecal Incontinence: no    Regular physical exercise: 30 minutes daily bike, walking daily    Patient verbalized understanding of information presented. AVS and Medicare Part B Preventive Services Table printed and given to pt and reviewed. See table for findings under Recommendation and Scheduled. All of the patient's questions were answered.

## 2022-09-28 NOTE — PROGRESS NOTES
Meghana Rebolledo (: 1945) is a 65 y.o. male, established patient, here for evaluation of the following chief complaint(s): Annual Wellness Visit and Follow Up Chronic Condition    ASSESSMENT/PLAN:  Below is the assessment and plan developed based on review of pertinent history, physical exam, labs, studies, and medications. 1. PAT (paroxysmal atrial tachycardia) (HCC)  Stable on current regimen. Has not had any irregular or fast heartbeat lately. -     metoprolol succinate (TOPROL-XL) 25 mg XL tablet; Take 1 Tablet by mouth daily. , Normal, Disp-90 Tablet, R-1  2. Bilateral primary osteoarthritis of knee  -     diclofenac (VOLTAREN) 1 % gel; Apply 2 g to affected area four (4) times daily. Apply to affected area, Normal, Disp-100 g, R-1DX Code Needed  . -     acetaminophen (TYLENOL) 500 mg tablet; Take 1 Tablet by mouth two (2) times a day for 30 days. , Normal, Disp-60 Tablet, R-0  -     REFERRAL TO PHYSICAL THERAPY  3. Gastroesophageal reflux disease without esophagitis  -   Stable on Prilosec. -     omeprazole (PRILOSEC) 20 mg capsule; Take 1 Capsule by mouth daily. , Normal, Disp-90 Capsule, R-0    4. Mixed hyperlipidemia  -     METABOLIC PANEL, COMPREHENSIVE; Future  -     CBC W/O DIFF; Future  -     LIPID PANEL; Future  5. Benign prostatic hyperplasia without lower urinary tract symptoms  -     tamsulosin (FLOMAX) 0.4 mg capsule; Take 1 Capsule by mouth daily. , Normal, Disp-90 Capsule, R-0  -     PSA, DIAGNOSTIC (PROSTATE SPECIFIC AG); Future  6. DDD (degenerative disc disease), lumbar  -     lidocaine (LIDODERM) 5 %; Apply patch to the affected area for 12 hours a day and remove for 12 hours a day., Normal, Disp-1 Each, R-0  -     acetaminophen (TYLENOL) 500 mg tablet; Take 1 Tablet by mouth two (2) times a day for 30 days. , Normal, Disp-60 Tablet, R-0  -     REFERRAL TO PHYSICAL THERAPY          SUBJECTIVE/OBJECTIVE:  Mr. Ally Sosa was seen today for follow-up.   History was obtained with the help of  ID [id number]    He has been seeing orthopedic surgeon Dr. Johnny Mcmillan for his knees and has been getting knee injections every 3 to 4 months. He has been having pain in his back and his hands and wants to know if he can get  strong pain medication. He is not checking his blood pressure at home as most of the time his readings been in the normal range. Today when he came first his reading was little higher but on repeat check came back in the normal range. He needs refill for all his medication today. He is not having any urinary frequency since he  has been on Flomax. He would like to try something for his  back pain  and if that does not help he is open to see another spine specialist.  He is fasting for his blood work. Visit Vitals  /78 (BP 1 Location: Right arm)   Pulse (!) 52   Temp 97.8 °F (36.6 °C)   Resp 16   Ht 5' 10\" (1.778 m)   Wt 175 lb 6.4 oz (79.6 kg)   SpO2 98%   BMI 25.17 kg/m²     Current Outpatient Medications   Medication Sig Dispense Refill    diclofenac (VOLTAREN) 1 % gel Apply 2 g to affected area four (4) times daily. Apply to affected area 100 g 1    metoprolol succinate (TOPROL-XL) 25 mg XL tablet Take 1 Tablet by mouth daily. 90 Tablet 1    aspirin 81 mg chewable tablet Take 1 Tablet by mouth daily. 30 Tablet 2    omeprazole (PRILOSEC) 20 mg capsule Take 1 Capsule by mouth daily. 90 Capsule 0    tamsulosin (FLOMAX) 0.4 mg capsule Take 1 Capsule by mouth daily. 90 Capsule 0    lidocaine (LIDODERM) 5 % Apply patch to the affected area for 12 hours a day and remove for 12 hours a day. 1 Each 0    acetaminophen (TYLENOL) 500 mg tablet Take 1 Tablet by mouth two (2) times a day for 30 days. 60 Tablet 0    fenofibrate (LOFIBRA) 160 mg tablet TAKE 1 TABLET BY MOUTH EVERY DAY 90 Tablet 0     Past Medical History:   Diagnosis Date    Arthritis     BPH (benign prostatic hyperplasia)     Hyperlipidemia      History reviewed. No pertinent surgical history.   Lab Results   Component Value Date/Time    WBC 4.9 07/13/2021 03:51 PM    HGB 14.8 07/13/2021 03:51 PM    HCT 44.1 07/13/2021 03:51 PM    PLATELET 506 39/18/9972 03:51 PM    MCV 94.4 07/13/2021 03:51 PM     Lab Results   Component Value Date/Time    Hemoglobin A1c 5.4 09/23/2020 02:42 PM    Glucose 92 07/13/2021 03:51 PM    LDL, calculated 109.8 (H) 07/13/2021 03:51 PM    Creatinine 1.08 07/13/2021 03:51 PM      Lab Results   Component Value Date/Time    ALT (SGPT) 26 07/13/2021 03:51 PM    Alk. phosphatase 48 07/13/2021 03:51 PM    Bilirubin, total 0.6 07/13/2021 03:51 PM    Albumin 4.4 07/13/2021 03:51 PM    Protein, total 7.4 07/13/2021 03:51 PM    PLATELET 362 14/93/6593 03:51 PM       Lab Results   Component Value Date/Time    GFR est non-AA >60 07/13/2021 03:51 PM    GFR est AA >60 07/13/2021 03:51 PM    Creatinine 1.08 07/13/2021 03:51 PM    BUN 16 07/13/2021 03:51 PM    Sodium 142 07/13/2021 03:51 PM    Potassium 4.6 07/13/2021 03:51 PM    Chloride 110 (H) 07/13/2021 03:51 PM    CO2 29 07/13/2021 03:51 PM        Review of Systems   Constitutional:  Negative for activity change, fatigue and unexpected weight change. HENT:  Negative for congestion, ear discharge, ear pain, hearing loss, rhinorrhea and sore throat. Eyes:  Negative for pain, discharge and redness. Respiratory:  Negative for cough, chest tightness and shortness of breath. Cardiovascular:  Negative for leg swelling. Gastrointestinal:  Negative for abdominal pain, constipation and diarrhea. Endocrine: Negative for polyuria. Genitourinary:  Negative for dysuria, flank pain and urgency. Musculoskeletal:  Positive for arthralgias, back pain and myalgias. Skin:  Negative for color change. Neurological:  Negative for dizziness, light-headedness and headaches. Psychiatric/Behavioral:  Negative for dysphoric mood and sleep disturbance. The patient is not nervous/anxious. Physical Exam      Vitals and nursing note reviewed.    Constitutional: Appearance: Normal appearance. Well built   Eyes:      Extraocular Movements: Extraocular movements intact. Pupils: Pupils are equal, round, and reactive to light. Cardiovascular:      Rate and Rhythm: Normal rate and regular rhythm. Pulmonary:      Effort: Pulmonary effort is normal.      Breath sounds: Normal breath sounds. Abdominal:      General: Bowel sounds are normal. There is no distension. Palpations: Abdomen is soft. Musculoskeletal:         General: No swelling. Normal range of motion. Cervical back: Normal range of motion and neck supple. Right lower leg: No edema. Left lower leg: No edema. Neurological:      General: No focal deficit present. Mental Status:  Alert and oriented to person, place, and time. Motor: No weakness. Psychiatric:         Mood and Affect: Mood normal.         Behavior: Behavior normal.         An electronic signature was used to authenticate this note.   -- Shreyas Ambriz MD

## 2022-09-30 ENCOUNTER — PATIENT OUTREACH (OUTPATIENT)
Dept: CASE MANAGEMENT | Age: 77
End: 2022-09-30

## 2022-09-30 NOTE — Clinical Note
Thank you again for this referral. The final attempt was made to reach the pt. ACP documents sent to pt via mail. My contact information was included. We will close the referral at this time.

## 2022-09-30 NOTE — ACP (ADVANCE CARE PLANNING)
Advance Care Planning   Ambulatory ACP Specialist Patient Outreach    Date:  9/30/2022 10/05/22 10/11/22          ACP Specialist:  Nba Smith LPN    Outreach call to patient in follow-up to ACP Specialist referral from:    [x] PCP  [] Provider   [] Ambulatory Care Management [] Other     For:                  [x] Advance Directive Assistance              [] Complete Portable DNR order              [] Complete POST/MOST              [] Code Status Discussion             [] Discuss Goals of Care             [] Early ACP Decision-Making              [] Other (Specify)    Date Referral Received: 9/29/22    Today's Outreach:  [x] First   [x] Second  [x] Third       Third outreach made by: [] Phone  [x] Email / mail    [] [employer]     Intervention:  [] Spoke with Patient   [x] Left VM requesting return call      Outcome: The first attempt was made to contact pt regarding the ACP referral. No answer on daughter's mobile phone. VM left requesting a return call. Will attempt second outreach within one week. Next Step:   [] ACP scheduled conversation  [x] Outreach again in one week               [x] Email / Mail ACP Info Sheets  [] Email / Mail Advance Directive   [x] Closing referral.  Routing closure to referring provider/staff and to ACP Specialist . [x] Closure letter mailed to patient with invitation to contact ACP Specialist if / when ready. Thank you for this referral.    10/05/22  The second attempt was made to contact pt regarding ACP referral. No answer on daughter's mobile phone. VM left requesting a return call. Will outreach again within one week. 10/11/22  The final attempt was made to reach the pt. ACP documents sent to pt via e-mail. LPN's contact information was included. We will close the referral at this time.

## 2022-10-05 ENCOUNTER — PATIENT OUTREACH (OUTPATIENT)
Dept: CASE MANAGEMENT | Age: 77
End: 2022-10-05

## 2022-10-11 ENCOUNTER — PATIENT OUTREACH (OUTPATIENT)
Dept: CASE MANAGEMENT | Age: 77
End: 2022-10-11

## 2022-12-14 ENCOUNTER — OFFICE VISIT (OUTPATIENT)
Dept: PRIMARY CARE CLINIC | Age: 77
End: 2022-12-14
Payer: MEDICARE

## 2022-12-14 VITALS
BODY MASS INDEX: 26 KG/M2 | WEIGHT: 181.6 LBS | SYSTOLIC BLOOD PRESSURE: 148 MMHG | DIASTOLIC BLOOD PRESSURE: 88 MMHG | OXYGEN SATURATION: 97 % | HEIGHT: 70 IN | RESPIRATION RATE: 18 BRPM | HEART RATE: 60 BPM | TEMPERATURE: 97.1 F

## 2022-12-14 DIAGNOSIS — M54.2 CERVICALGIA: Primary | ICD-10-CM

## 2022-12-14 DIAGNOSIS — I10 ESSENTIAL HYPERTENSION: ICD-10-CM

## 2022-12-14 DIAGNOSIS — M17.0 BILATERAL PRIMARY OSTEOARTHRITIS OF KNEE: ICD-10-CM

## 2022-12-14 PROCEDURE — 3074F SYST BP LT 130 MM HG: CPT

## 2022-12-14 PROCEDURE — 3078F DIAST BP <80 MM HG: CPT

## 2022-12-14 PROCEDURE — 99214 OFFICE O/P EST MOD 30 MIN: CPT

## 2022-12-14 PROCEDURE — 1123F ACP DISCUSS/DSCN MKR DOCD: CPT

## 2022-12-14 RX ORDER — METHYLPREDNISOLONE 4 MG/1
TABLET ORAL
Qty: 1 DOSE PACK | Refills: 0 | Status: SHIPPED | OUTPATIENT
Start: 2022-12-14

## 2022-12-14 RX ORDER — DICLOFENAC SODIUM 10 MG/G
2 GEL TOPICAL 4 TIMES DAILY
Qty: 100 G | Refills: 1 | Status: SHIPPED | OUTPATIENT
Start: 2022-12-14

## 2022-12-14 RX ORDER — CYCLOBENZAPRINE HCL 10 MG
10 TABLET ORAL
Qty: 12 TABLET | Refills: 0 | Status: SHIPPED | OUTPATIENT
Start: 2022-12-14

## 2022-12-14 NOTE — PROGRESS NOTES
[facility]   Michelle Saleh 64[address], [address] [facility]  P: [phone]  F: [phone]    SUBJECTIVE     HPI:     Jessica Law is a 65 y.o. male who is seen in the clinic for   Chief Complaint   Patient presents with    Neck Pain     Patient states he can not move neck and it started 3 days ago      Established patient in our practice, new to me. His PCP is Dr Yadiel De Guzman. The patient's primary language is [place]. We are using  Services for this visit,  #: [id number]    Patient presents with c/o neck pain, ongoing for 3 days. No trauma hx or recent injury to neck, states he woke up with the pain. Hurts to move neck to the sides, to look down and up. Pain is especially worse at night when he is trying to sleep. He denies fevers, malaise. Taking Tylenol PRN and using a heating pad for the pain with mild relief. He is participating in PT for back and knee pain, has not been able to go recently as he does not drive. HTN: His blood pressure is elevated in clinic today at 148/88. He has not taken his Metoprolol yet today. He monitors his blood pressure at home and states it is usually well controlled, home readings are typically 120-130's/80's. Patient Active Problem List    Diagnosis    PAT (paroxysmal atrial tachycardia) (HCC)    Benign prostatic hyperplasia without lower urinary tract symptoms    Arthritis    Gastroesophageal reflux disease without esophagitis    Mixed hyperlipidemia        Past Medical History:   Diagnosis Date    Arthritis     BPH (benign prostatic hyperplasia)     Hyperlipidemia      History reviewed. No pertinent surgical history.   Social History     Socioeconomic History    Marital status:      Spouse name: Not on file    Number of children: Not on file    Years of education: Not on file    Highest education level: Not on file   Occupational History    Not on file   Tobacco Use    Smoking status: Never    Smokeless tobacco: Never   Vaping Use Vaping Use: Never used   Substance and Sexual Activity    Alcohol use: Not Currently    Drug use: Never    Sexual activity: Not Currently   Other Topics Concern    Not on file   Social History Narrative    Not on file     Social Determinants of Health     Financial Resource Strain: Not on file   Food Insecurity: Not on file   Transportation Needs: Not on file   Physical Activity: Not on file   Stress: Not on file   Social Connections: Not on file   Intimate Partner Violence: Not on file   Housing Stability: Not on file     History reviewed. No pertinent family history. Immunization History   Administered Date(s) Administered    COVID-19, PFIZER PURPLE top, DILUTE for use, (age 15 y+), IM, 30mcg/0.3mL 04/02/2021, 04/23/2021, 03/08/2022    Influenza High Dose Vaccine PF 09/28/2022    Influenza, FLUAD, (age 72 y+), Adjuvanted 09/23/2020, 09/28/2022    Pneumococcal Polysaccharide (PPSV-23) 07/27/2016, 09/27/2021      No Known Allergies    Telephone on 09/29/2022   Component Date Value Ref Range Status    Occult blood fecal, by IA 09/29/2022 Negative  Negative Final    SPECIMEN STATUS REPORT 09/29/2022 COMMENT   Final    Comment: Specimen Identification Status  Specimen Identification Status  The sample was received by the laboratory unlabeled. The [de-identified]  Clinician has authorized release of the results. The [de-identified]  Clinician agrees to assume responsibility for sample identification. AUTHORIZED BY: Katie Manning. 09/30/2022     Orders Only on 09/29/2022   Component Date Value Ref Range Status    Cholesterol, total 09/29/2022 163  <200 MG/DL Final    Triglyceride 09/29/2022 89  <150 MG/DL Final    Based on NCEP-ATP III:  Triglycerides <150 mg/dL  is considered normal, 150-199 mg/dL  borderline high,  200-499 mg/dL high and  greater than or equal to 500 mg/dL very high. HDL Cholesterol 09/29/2022 49  MG/DL Final    Based on NCEP ATP III, HDL Cholesterol <40 mg/dL is considered low and >60 mg/dL is elevated. LDL, calculated 09/29/2022 96.2  0 - 100 MG/DL Final    Comment: Based on the NCEP-ATP: LDL-C concentrations are considered  optimal <100 mg/dL,  near optimal/above Normal 100-129 mg/dL  Borderline High: 130-159, High: 160-189 mg/dL  Very High: Greater than or equal to 190 mg/dL      VLDL, calculated 09/29/2022 17.8  MG/DL Final    CHOL/HDL Ratio 09/29/2022 3.3  0.0 - 5.0   Final    WBC 09/29/2022 6.3  4.1 - 11.1 K/uL Final    RBC 09/29/2022 4.52  4.10 - 5.70 M/uL Final    HGB 09/29/2022 14.3  12.1 - 17.0 g/dL Final    HCT 09/29/2022 43.5  36.6 - 50.3 % Final    MCV 09/29/2022 96.2  80.0 - 99.0 FL Final    MCH 09/29/2022 31.6  26.0 - 34.0 PG Final    MCHC 09/29/2022 32.9  30.0 - 36.5 g/dL Final    RDW 09/29/2022 12.3  11.5 - 14.5 % Final    PLATELET 24/10/3492 760  150 - 400 K/uL Final    MPV 09/29/2022 11.1  8.9 - 12.9 FL Final    NRBC 09/29/2022 0.0  0  WBC Final    ABSOLUTE NRBC 09/29/2022 0.00  0.00 - 0.01 K/uL Final    Sodium 09/29/2022 142  136 - 145 mmol/L Final    Potassium 09/29/2022 5.2 (A)  3.5 - 5.1 mmol/L Final    Chloride 09/29/2022 111 (A)  97 - 108 mmol/L Final    CO2 09/29/2022 30  21 - 32 mmol/L Final    Anion gap 09/29/2022 1 (A)  5 - 15 mmol/L Final    Glucose 09/29/2022 95  65 - 100 mg/dL Final    BUN 09/29/2022 27 (A)  6 - 20 MG/DL Final    Creatinine 09/29/2022 1.24  0.70 - 1.30 MG/DL Final    BUN/Creatinine ratio 09/29/2022 22 (A)  12 - 20   Final    GFR est AA 09/29/2022 >60  >60 ml/min/1.73m2 Final    GFR est non-AA 09/29/2022 56 (A)  >60 ml/min/1.73m2 Final    Estimated GFR is calculated using the IDMS-traceable Modification of Diet in Renal Disease (MDRD) Study equation, reported for both  Americans (GFRAA) and non- Americans (GFRNA), and normalized to 1.73m2 body surface area. The physician must decide which value applies to the patient.     Calcium 09/29/2022 9.4  8.5 - 10.1 MG/DL Final    Bilirubin, total 09/29/2022 0.6  0.2 - 1.0 MG/DL Final    ALT (SGPT) 09/29/2022 24  12 - 78 U/L Final    AST (SGOT) 09/29/2022 15  15 - 37 U/L Final    Alk. phosphatase 09/29/2022 50  45 - 117 U/L Final    Protein, total 09/29/2022 7.0  6.4 - 8.2 g/dL Final    Albumin 09/29/2022 4.2  3.5 - 5.0 g/dL Final    Globulin 09/29/2022 2.8  2.0 - 4.0 g/dL Final    A-G Ratio 09/29/2022 1.5  1.1 - 2.2   Final    Prostate Specific Ag 09/29/2022 1.3  0.01 - 4.0 ng/mL Final    Comment: Method used is Scurri  (NOTE)  Many types of test methods are used to measure PSA and can yield   different results on any given specimen. Therefore PSA results from   different laboratories on the same patient are not directly   comparable. In addition, PSA values by themselves should not be   interpreted as the presence or absence of cancer. PSA values used to   monitor for biochemical recurrence of prostate cancer should be   interpreted in accordance with current clinical guidelines (e.g. the   2013 American Urological Association (AUA) guidelines and the 2015    Association of Urology (EAU) guidelines). NUCLEAR CARDIAC STRESS TEST  · SPECT: Normal myocardial perfusion imaging. Myocardial perfusion imaging   supports a low risk stress test.  · SPECT: Left ventricular function post-stress was normal. Calculated   ejection fraction is 51% (normal EF value is equal to or greater than   50%). Left ventricular wall motion was normal at stress, no regional wall   motion abnormality noted. There is no evidence of transient ischemic   dilation (TID). · Baseline ECG: Normal EKG. · SPECT: Left ventricular perfusion is normal.        Current Outpatient Medications   Medication Sig Dispense Refill    diclofenac (VOLTAREN) 1 % gel Apply 2 g to affected area four (4) times daily. Apply to affected area 100 g 1    metoprolol succinate (TOPROL-XL) 25 mg XL tablet Take 1 Tablet by mouth daily. 90 Tablet 1    aspirin 81 mg chewable tablet Take 1 Tablet by mouth daily.  30 Tablet 2    omeprazole (PRILOSEC) 20 mg capsule Take 1 Capsule by mouth daily. 90 Capsule 0    tamsulosin (FLOMAX) 0.4 mg capsule Take 1 Capsule by mouth daily. 90 Capsule 0    lidocaine (LIDODERM) 5 % Apply patch to the affected area for 12 hours a day and remove for 12 hours a day. 1 Each 0    fenofibrate (LOFIBRA) 160 mg tablet TAKE 1 TABLET BY MOUTH EVERY DAY 90 Tablet 0           The past medical history, past surgical history, and family history were reviewed and updated in the medical record. Lab values/Imaging were reviewed. The medications were reviewed and updated in the medical record. Immunizations were reviewed and updated in the medical record. All relevant preventative screenings reviewed and updated in the medical record. REVIEW OF SYSTEMS   Review of Systems   Constitutional:  Negative for chills, diaphoresis, fever, malaise/fatigue and weight loss. Respiratory:  Negative for cough, shortness of breath and wheezing. Cardiovascular:  Negative for chest pain, palpitations and leg swelling. Musculoskeletal:  Positive for myalgias and neck pain. Negative for back pain and falls. Neurological:  Negative for headaches. All other systems reviewed and are negative. PHYSICAL EXAM   BP (!) 148/88   Pulse 60   Temp 97.1 °F (36.2 °C) (Temporal)   Resp 18   Ht 5' 10\" (1.778 m)   Wt 181 lb 9.6 oz (82.4 kg)   SpO2 97%   BMI 26.06 kg/m²      Physical Exam  Vitals and nursing note reviewed. Constitutional:       Appearance: Normal appearance. Cardiovascular:      Rate and Rhythm: Normal rate and regular rhythm. Pulses: Normal pulses. Heart sounds: Normal heart sounds. No murmur heard. Pulmonary:      Effort: Pulmonary effort is normal. No respiratory distress. Breath sounds: Normal breath sounds. No wheezing or rales. Musculoskeletal:      Cervical back: Neck supple. Tenderness present. No edema, erythema, signs of trauma, rigidity or crepitus.  Pain with movement and muscular tenderness present. Skin:     General: Skin is warm and dry. Neurological:      General: No focal deficit present. Mental Status: He is alert and oriented to person, place, and time. Mental status is at baseline. Cranial Nerves: No cranial nerve deficit. Sensory: No sensory deficit. Motor: No weakness. Gait: Gait normal.   Psychiatric:         Mood and Affect: Mood normal.         Behavior: Behavior normal.         Thought Content: Thought content normal.         Judgment: Judgment normal.          ASSESSMENT/ PLAN   Below is the assessment and plan developed based on review of pertinent history, physical exam, labs, studies, and medications. 1. Cervicalgia  -     I prescribed a Medrol Dosepack and Flexeril PRN. Potential side effects were discussed. - If no relief from treatment prescribed today in 2 days, get cervical x-ray. - Discussed referral to PT. He would like to try medications first. If no gradual improvement and normal x-ray, will refer to PT.  - Discussed side effect of drowsiness with Flexeril. Advised to take Flexeril one hour prior to bed. Continue Tylenol PRN for mild pain. - cyclobenzaprine (FLEXERIL) 10 mg tablet; Take 1 Tablet by mouth three (3) times daily as needed for Muscle Spasm(s). , Normal, Disp-12 Tablet, R-0  -     methylPREDNISolone (MEDROL DOSEPACK) 4 mg tablet; Take per package instructions. Take with food., Normal, Disp-1 Dose Pack, R-0  -     XR SPINE CERV 4 OR 5 V; Future  2. Bilateral primary osteoarthritis of knee  -     I refilled his Voltaren gel. Discussed that he can use on neck as needed for mild pain as well. - diclofenac (VOLTAREN) 1 % gel; Apply 2 g to affected area four (4) times daily. Apply to affected area, Normal, Disp-100 g, R-1DX Code Needed  . 3. Essential hypertension   - BP elevated in clinic. He is asymptomatic. He did not take his Metoprolol this morning.   - Advised to take Metoprolol as soon as he is able to.  Monitor BP while taking steroid as it can increase BP. He is agreeable to this. RTC PRN      Disclaimer:  Advised patient to call back or return to office if symptoms worsen/change/persist.  Discussed expected course/resolution/complications of diagnosis in detail with patient. Medication risks/benefits/alternatives discussed with patient. Patient was given an after visit summary which includes diagnoses, current medications, & vitals. Discussed patient instructions and advised to read to all patient instructions regarding care. Patient expressed understanding with the diagnosis and plan.        Donell Amaral NP  12/14/2022

## 2022-12-14 NOTE — PROGRESS NOTES
Identified pt with two pt identifiers(name and ). Chief Complaint   Patient presents with    Neck Pain     Patient states he can not move neck and it started 3 days ago        3 most recent PHQ Screens 2022   Little interest or pleasure in doing things Not at all   Feeling down, depressed, irritable, or hopeless Not at all   Total Score PHQ 2 0        Vitals:    22 0954 22 1008   BP: (!) 166/85 (!) 158/80   Pulse: 60 60   Resp: 18 18   Temp: 97.1 °F (36.2 °C)    TempSrc: Temporal    SpO2: 98% 97%   Weight: 181 lb 9.6 oz (82.4 kg)    Height: 5' 10\" (1.778 m)    PainSc:   7    PainLoc: Neck        Health Maintenance Due   Topic Date Due    DTaP/Tdap/Td series (1 - Tdap) Never done    Shingrix Vaccine Age 50> (1 of 2) Never done    COVID-19 Vaccine (4 - Booster for Pfizer series) 2022    Pneumococcal 65+ years (2 - PCV) 2022        1. Have you been to the ER, urgent care clinic since your last visit? Hospitalized since your last visit? No    2. Have you seen or consulted any other health care providers outside of the [address] [place] [address] since your last visit? Include any pap smears or colon screening. No    3. For patients aged 39-70: Has the patient had a colonoscopy / FIT/ Cologuard?  NA - based on age

## 2022-12-21 DIAGNOSIS — N40.0 BENIGN PROSTATIC HYPERPLASIA WITHOUT LOWER URINARY TRACT SYMPTOMS: ICD-10-CM

## 2022-12-21 RX ORDER — TAMSULOSIN HYDROCHLORIDE 0.4 MG/1
CAPSULE ORAL
Qty: 90 CAPSULE | Refills: 0 | Status: SHIPPED | OUTPATIENT
Start: 2022-12-21

## 2023-01-03 DIAGNOSIS — N40.0 BENIGN PROSTATIC HYPERPLASIA WITHOUT LOWER URINARY TRACT SYMPTOMS: ICD-10-CM

## 2023-01-03 RX ORDER — TAMSULOSIN HYDROCHLORIDE 0.4 MG/1
CAPSULE ORAL
Qty: 90 CAPSULE | Refills: 0 | Status: SHIPPED | OUTPATIENT
Start: 2023-01-03

## 2023-03-22 DIAGNOSIS — N40.0 BENIGN PROSTATIC HYPERPLASIA WITHOUT LOWER URINARY TRACT SYMPTOMS: ICD-10-CM

## 2023-03-22 DIAGNOSIS — I47.1 PAT (PAROXYSMAL ATRIAL TACHYCARDIA) (HCC): ICD-10-CM

## 2023-03-22 DIAGNOSIS — E78.2 MIXED HYPERLIPIDEMIA: ICD-10-CM

## 2023-03-22 DIAGNOSIS — K21.9 GASTROESOPHAGEAL REFLUX DISEASE WITHOUT ESOPHAGITIS: ICD-10-CM

## 2023-03-22 DIAGNOSIS — M17.0 BILATERAL PRIMARY OSTEOARTHRITIS OF KNEE: ICD-10-CM

## 2023-03-22 RX ORDER — FENOFIBRATE 160 MG/1
160 TABLET ORAL DAILY
Qty: 90 TABLET | Refills: 0 | Status: CANCELLED | OUTPATIENT
Start: 2023-03-22

## 2023-03-22 RX ORDER — METOPROLOL SUCCINATE 25 MG/1
25 TABLET, EXTENDED RELEASE ORAL DAILY
Qty: 90 TABLET | Refills: 1 | Status: CANCELLED | OUTPATIENT
Start: 2023-03-22

## 2023-03-22 NOTE — TELEPHONE ENCOUNTER
Patient needs 90-day supplies. [place] PCP: Yuli Ibanez MD    Last appt: 12/14/2022  No future appointments. Requested Prescriptions     Pending Prescriptions Disp Refills    diclofenac (VOLTAREN) 1 % gel 100 g 1     Sig: Apply 2 g to affected area four (4) times daily. Apply to affected area    aspirin 81 mg chewable tablet 30 Tablet 2     Sig: Take 1 Tablet by mouth daily. fenofibrate (LOFIBRA) 160 mg tablet 90 Tablet 0     Sig: Take 1 Tablet by mouth daily. tamsulosin (FLOMAX) 0.4 mg capsule 90 Capsule 0     Sig: Take 1 Capsule by mouth daily. omeprazole (PRILOSEC) 20 mg capsule 90 Capsule 0     Sig: Take 1 Capsule by mouth daily. metoprolol succinate (TOPROL-XL) 25 mg XL tablet 90 Tablet 1     Sig: Take 1 Tablet by mouth daily.        Prior labs and Blood pressures:  BP Readings from Last 3 Encounters:   12/14/22 (!) 148/88   09/28/22 136/78   11/24/21 122/70     Lab Results   Component Value Date/Time    Sodium 142 09/29/2022 09:51 AM    Potassium 5.2 (H) 09/29/2022 09:51 AM    Chloride 111 (H) 09/29/2022 09:51 AM    CO2 30 09/29/2022 09:51 AM    Anion gap 1 (L) 09/29/2022 09:51 AM    Glucose 95 09/29/2022 09:51 AM    BUN 27 (H) 09/29/2022 09:51 AM    Creatinine 1.24 09/29/2022 09:51 AM    BUN/Creatinine ratio 22 (H) 09/29/2022 09:51 AM    GFR est AA >60 09/29/2022 09:51 AM    GFR est non-AA 56 (L) 09/29/2022 09:51 AM    Calcium 9.4 09/29/2022 09:51 AM

## 2023-03-24 DIAGNOSIS — E78.2 MIXED HYPERLIPIDEMIA: ICD-10-CM

## 2023-03-24 RX ORDER — FENOFIBRATE 160 MG/1
TABLET ORAL
Qty: 90 TABLET | Refills: 0 | Status: SHIPPED | OUTPATIENT
Start: 2023-03-24

## 2023-03-27 ENCOUNTER — TELEPHONE (OUTPATIENT)
Dept: CARDIOLOGY CLINIC | Age: 78
End: 2023-03-27

## 2023-03-27 DIAGNOSIS — I47.1 PAT (PAROXYSMAL ATRIAL TACHYCARDIA) (HCC): ICD-10-CM

## 2023-03-27 RX ORDER — METOPROLOL SUCCINATE 25 MG/1
25 TABLET, EXTENDED RELEASE ORAL DAILY
Qty: 90 TABLET | Refills: 1 | Status: SHIPPED | OUTPATIENT
Start: 2023-03-27

## 2023-03-27 NOTE — TELEPHONE ENCOUNTER
Pt is calling because he needs a refill on the metoprolol succinate 25 mg XL. Pt would like a 90 day refill. Pharmacy is confirmed. Pt is leaving to go out of the country on  4/2/23.    [phone]

## 2023-03-30 RX ORDER — GUAIFENESIN 100 MG/5ML
81 LIQUID (ML) ORAL DAILY
Qty: 30 TABLET | Refills: 2 | Status: SHIPPED | OUTPATIENT
Start: 2023-03-30

## 2023-03-30 RX ORDER — OMEPRAZOLE 20 MG/1
20 CAPSULE, DELAYED RELEASE ORAL DAILY
Qty: 90 CAPSULE | Refills: 0 | Status: SHIPPED | OUTPATIENT
Start: 2023-03-30

## 2023-03-30 RX ORDER — DICLOFENAC SODIUM 10 MG/G
2 GEL TOPICAL 4 TIMES DAILY
Qty: 100 G | Refills: 1 | Status: SHIPPED | OUTPATIENT
Start: 2023-03-30

## 2023-03-30 RX ORDER — TAMSULOSIN HYDROCHLORIDE 0.4 MG/1
0.4 CAPSULE ORAL DAILY
Qty: 90 CAPSULE | Refills: 0 | Status: SHIPPED | OUTPATIENT
Start: 2023-03-30

## 2023-03-30 NOTE — TELEPHONE ENCOUNTER
Requested Prescriptions     Pending Prescriptions Disp Refills    diclofenac (VOLTAREN) 1 % gel 100 g 1     Sig: Apply 2 g to affected area four (4) times daily. Apply to affected area    aspirin 81 mg chewable tablet 30 Tablet 2     Sig: Take 1 Tablet by mouth daily. tamsulosin (FLOMAX) 0.4 mg capsule 90 Capsule 0     Sig: Take 1 Capsule by mouth daily. omeprazole (PRILOSEC) 20 mg capsule 90 Capsule 0     Sig: Take 1 Capsule by mouth daily.         Last Visit  12/14/22  Last Refill   12/14/22  9/28/22  1/3/23  9/28/22

## 2023-05-19 RX ORDER — TAMSULOSIN HYDROCHLORIDE 0.4 MG/1
0.4 CAPSULE ORAL DAILY
COMMUNITY
Start: 2023-03-30

## 2023-05-19 RX ORDER — ASPIRIN 81 MG/1
81 TABLET, CHEWABLE ORAL DAILY
COMMUNITY
Start: 2023-03-30

## 2023-05-19 RX ORDER — METHYLPREDNISOLONE 4 MG/1
TABLET ORAL
COMMUNITY
Start: 2022-12-14

## 2023-05-19 RX ORDER — CYCLOBENZAPRINE HCL 10 MG
10 TABLET ORAL 3 TIMES DAILY PRN
COMMUNITY
Start: 2022-12-14

## 2023-05-19 RX ORDER — LIDOCAINE 50 MG/G
PATCH TOPICAL
COMMUNITY
Start: 2022-09-28

## 2023-05-19 RX ORDER — METOPROLOL SUCCINATE 25 MG/1
25 TABLET, EXTENDED RELEASE ORAL DAILY
COMMUNITY
Start: 2023-03-27

## 2023-05-19 RX ORDER — FENOFIBRATE 160 MG/1
1 TABLET ORAL DAILY
COMMUNITY
Start: 2023-03-24 | End: 2023-06-22

## 2023-05-19 RX ORDER — OMEPRAZOLE 20 MG/1
20 CAPSULE, DELAYED RELEASE ORAL DAILY
COMMUNITY
Start: 2023-03-30

## 2023-06-21 DIAGNOSIS — E78.2 MIXED HYPERLIPIDEMIA: ICD-10-CM

## 2023-06-22 RX ORDER — FENOFIBRATE 160 MG/1
160 TABLET ORAL DAILY
Qty: 90 TABLET | Refills: 0 | Status: SHIPPED | OUTPATIENT
Start: 2023-06-22

## 2023-09-16 DIAGNOSIS — I47.10 SUPRAVENTRICULAR TACHYCARDIA: ICD-10-CM

## 2023-09-17 RX ORDER — METOPROLOL SUCCINATE 25 MG/1
25 TABLET, EXTENDED RELEASE ORAL DAILY
Qty: 90 TABLET | Refills: 0 | Status: SHIPPED | OUTPATIENT
Start: 2023-09-17

## 2023-09-19 DIAGNOSIS — E78.2 MIXED HYPERLIPIDEMIA: ICD-10-CM

## 2023-09-19 RX ORDER — FENOFIBRATE 160 MG/1
160 TABLET ORAL DAILY
Qty: 90 TABLET | Refills: 0 | Status: SHIPPED | OUTPATIENT
Start: 2023-09-19

## 2023-10-21 DIAGNOSIS — N40.0 BENIGN PROSTATIC HYPERPLASIA WITHOUT LOWER URINARY TRACT SYMPTOMS: ICD-10-CM

## 2023-10-22 RX ORDER — TAMSULOSIN HYDROCHLORIDE 0.4 MG/1
CAPSULE ORAL DAILY
Qty: 90 CAPSULE | Refills: 0 | Status: SHIPPED | OUTPATIENT
Start: 2023-10-22

## 2023-12-27 DIAGNOSIS — E78.2 MIXED HYPERLIPIDEMIA: ICD-10-CM

## 2023-12-27 DIAGNOSIS — I47.10 SUPRAVENTRICULAR TACHYCARDIA: ICD-10-CM

## 2023-12-27 RX ORDER — METOPROLOL SUCCINATE 25 MG/1
25 TABLET, EXTENDED RELEASE ORAL DAILY
Qty: 7 TABLET | Refills: 0 | Status: SHIPPED | OUTPATIENT
Start: 2023-12-27

## 2023-12-27 RX ORDER — FENOFIBRATE 160 MG/1
160 TABLET ORAL DAILY
Qty: 7 TABLET | Refills: 0 | Status: SHIPPED | OUTPATIENT
Start: 2023-12-27

## 2024-01-09 DIAGNOSIS — N40.0 BENIGN PROSTATIC HYPERPLASIA WITHOUT LOWER URINARY TRACT SYMPTOMS: ICD-10-CM

## 2024-01-09 RX ORDER — TAMSULOSIN HYDROCHLORIDE 0.4 MG/1
CAPSULE ORAL DAILY
Qty: 90 CAPSULE | Refills: 0 | Status: SHIPPED | OUTPATIENT
Start: 2024-01-09